# Patient Record
Sex: MALE | Race: WHITE | NOT HISPANIC OR LATINO | URBAN - METROPOLITAN AREA
[De-identification: names, ages, dates, MRNs, and addresses within clinical notes are randomized per-mention and may not be internally consistent; named-entity substitution may affect disease eponyms.]

---

## 2020-10-16 ENCOUNTER — OFFICE VISIT (OUTPATIENT)
Dept: ENDOCRINOLOGY | Facility: CLINIC | Age: 35
End: 2020-10-16

## 2020-10-16 VITALS
HEIGHT: 75 IN | TEMPERATURE: 98 F | OXYGEN SATURATION: 96 % | SYSTOLIC BLOOD PRESSURE: 122 MMHG | HEART RATE: 83 BPM | DIASTOLIC BLOOD PRESSURE: 74 MMHG | WEIGHT: 246.4 LBS | BODY MASS INDEX: 30.64 KG/M2 | RESPIRATION RATE: 16 BRPM

## 2020-10-16 DIAGNOSIS — IMO0002 DIABETES MELLITUS TYPE 2, UNCONTROLLED, WITH COMPLICATIONS: Primary | ICD-10-CM

## 2020-10-16 LAB — HBA1C MFR BLD: 7.5 %

## 2020-10-16 PROCEDURE — 99213 OFFICE O/P EST LOW 20 MIN: CPT | Performed by: INTERNAL MEDICINE

## 2020-10-16 PROCEDURE — 83036 HEMOGLOBIN GLYCOSYLATED A1C: CPT | Performed by: INTERNAL MEDICINE

## 2020-10-16 RX ORDER — DAPAGLIFLOZIN AND METFORMIN HYDROCHLORIDE 2.5; 1 MG/1; MG/1
1 TABLET, FILM COATED, EXTENDED RELEASE ORAL DAILY
Qty: 30 TABLET | Refills: 5 | Status: SHIPPED | OUTPATIENT
Start: 2020-10-16 | End: 2021-01-26 | Stop reason: SDUPTHER

## 2020-10-16 RX ORDER — FLASH GLUCOSE SENSOR
KIT MISCELLANEOUS
COMMUNITY
Start: 2020-10-03 | End: 2020-10-16 | Stop reason: SDUPTHER

## 2020-10-16 RX ORDER — INSULIN GLARGINE AND LIXISENATIDE 100; 33 U/ML; UG/ML
INJECTION, SOLUTION SUBCUTANEOUS
COMMUNITY
Start: 2020-09-28 | End: 2020-10-16 | Stop reason: SDUPTHER

## 2020-10-16 RX ORDER — MONTELUKAST SODIUM 10 MG/1
TABLET ORAL
Status: CANCELLED | OUTPATIENT
Start: 2020-10-16

## 2020-10-16 RX ORDER — DAPAGLIFLOZIN AND METFORMIN HYDROCHLORIDE 2.5; 1 MG/1; MG/1
TABLET, FILM COATED, EXTENDED RELEASE ORAL
COMMUNITY
Start: 2020-10-12 | End: 2020-10-16 | Stop reason: SDUPTHER

## 2020-10-16 RX ORDER — MONTELUKAST SODIUM 10 MG/1
10 TABLET ORAL NIGHTLY PRN
COMMUNITY
Start: 2020-10-14

## 2020-10-16 RX ORDER — PEN NEEDLE, DIABETIC 31 GX5/16"
NEEDLE, DISPOSABLE MISCELLANEOUS
COMMUNITY
Start: 2020-07-13 | End: 2021-03-22 | Stop reason: SDUPTHER

## 2020-10-16 RX ORDER — FLASH GLUCOSE SENSOR
1 KIT MISCELLANEOUS
Qty: 2 EACH | Refills: 5 | Status: SHIPPED | OUTPATIENT
Start: 2020-10-16 | End: 2021-03-22 | Stop reason: SDUPTHER

## 2020-10-16 RX ORDER — INSULIN GLARGINE AND LIXISENATIDE 100; 33 U/ML; UG/ML
60 INJECTION, SOLUTION SUBCUTANEOUS DAILY
Qty: 3 PEN | Refills: 5 | Status: SHIPPED | OUTPATIENT
Start: 2020-10-16 | End: 2021-03-22 | Stop reason: SDUPTHER

## 2020-10-16 NOTE — ASSESSMENT & PLAN NOTE
A1c - while not at goal is the best he has ever been. I would stay the course and ask him to force fluids to treat the dizziness

## 2020-10-16 NOTE — PROGRESS NOTES
Office Note      Date: 10/16/2020  Patient Name: Bobby Medina  MRN: [id number]  : 1985    Chief Complaint   Patient presents with   • Diabetes       History of Present Illness:   Bobby Medina is a 35 y.o. male who presents for Diabetes type 2. Diagnosed in: . Treated in past with insulin. Current treatments: insulin, metformin, sglt2 and glp1. Number of insulin shots per day: none. Checks blood sugar >4 times a day. Has low blood sugar: occasional.     Last A1c:  Hemoglobin A1C   Date Value Ref Range Status   10/16/2020 7.5 % Final       Changes in health since last visit: none . Last eye exam 2019.    Subjective      Diabetic Complications:  Eyes: No  Kidneys: No  Feet: No  Heart: No    Diet and Exercise:  Meals per day: 3  Minutes of exercise per week: 150  mins.    Review of Systems:   Review of Systems   Constitutional: Negative.    HENT: Negative.    Eyes: Negative.    Respiratory: Negative.    Endocrine: Negative.        The following portions of the patient's history were reviewed and updated as appropriate: allergies, current medications, past family history, past medical history, past social history, past surgical history and problem list.    Objective       Labs:    CMP  No results found for: GLUCOSE, BUN, CREATININE, EGFRIFNONA, EGFRIFAFRI, BCR, K, CO2, CALCIUM, PROTENTOTREF, LABIL2, BILIRUBIN, AST, ALT     CBC w/DIFF  No results found for: WBC, RBC, HGB, HCT, MCV, MCH, MCHC, RDW, RDWSD, MPV, PLT, NEUTRORELPCT, LYMPHORELPCT, MONORELPCT, EOSRELPCT, BASORELPCT, AUTOIGPER, NEUTROABS, LYMPHSABS, MONOSABS, EOSABS, BASOSABS, AUTOIGNUM, NRBC    Physical Exam:  Physical Exam  Vitals signs reviewed.   Constitutional:       Appearance: Normal appearance. He is normal weight.   Neck:      Musculoskeletal: Neck supple.   Cardiovascular:      Pulses:           Dorsalis pedis pulses are 2+ on the right side and 2+ on the left side.        Posterior tibial pulses are 2+ on the right side and 2+ on the  left side.   Musculoskeletal: Normal range of motion.      Right foot: Normal range of motion. No deformity, bunion, Charcot foot, foot drop or prominent metatarsal heads.      Left foot: Normal range of motion. No deformity, bunion, Charcot foot, foot drop or prominent metatarsal heads.   Feet:      Right foot:      Protective Sensation: 10 sites tested. 10 sites sensed.      Skin integrity: Skin integrity normal.      Toenail Condition: Right toenails are normal.      Left foot:      Protective Sensation: 10 sites tested. 10 sites sensed.      Skin integrity: Skin integrity normal.      Toenail Condition: Left toenails are normal.      Comments: Diabetic Foot Exam Performed and Monofilament Test Performed  Skin:     General: Skin is warm and dry.   Neurological:      Mental Status: He is alert.          Assessment / Plan      Assessment & Plan:  Problem List Items Addressed This Visit        Endocrine    Diabetes mellitus type 2, uncontrolled, with complications (CMS/[facility]) - Primary    Current Assessment & Plan     A1c - while not at goal is the best he has ever been. I would stay the course and ask him to force fluids to treat the dizziness          Relevant Medications    Soliqua 100-33 UNT-MCG/ML solution pen-injector injection    Xigduo XR 2.5-1000 MG tablet sustained-release 24 hour    Other Relevant Orders    POC Glycosylated Hemoglobin (Hb A1C) (Completed)           Rasheed Emanuel MD   10/16/2020

## 2021-01-26 DIAGNOSIS — IMO0002 DIABETES MELLITUS TYPE 2, UNCONTROLLED, WITH COMPLICATIONS: ICD-10-CM

## 2021-01-26 RX ORDER — DAPAGLIFLOZIN AND METFORMIN HYDROCHLORIDE 2.5; 1 MG/1; MG/1
2 TABLET, FILM COATED, EXTENDED RELEASE ORAL DAILY
Qty: 60 TABLET | Refills: 3 | Status: SHIPPED | OUTPATIENT
Start: 2021-01-26 | End: 2021-02-08 | Stop reason: SDUPTHER

## 2021-02-08 DIAGNOSIS — IMO0002 DIABETES MELLITUS TYPE 2, UNCONTROLLED, WITH COMPLICATIONS: ICD-10-CM

## 2021-02-08 RX ORDER — DAPAGLIFLOZIN AND METFORMIN HYDROCHLORIDE 2.5; 1 MG/1; MG/1
2 TABLET, FILM COATED, EXTENDED RELEASE ORAL DAILY
Qty: 60 TABLET | Refills: 5 | Status: SHIPPED | OUTPATIENT
Start: 2021-02-08 | End: 2021-03-22 | Stop reason: SDUPTHER

## 2021-02-08 NOTE — TELEPHONE ENCOUNTER
Patient called and needs a refill for his Xigduo 2.5 tablet sent to [facility] in [place]. Please advise.

## 2021-03-22 ENCOUNTER — OFFICE VISIT (OUTPATIENT)
Dept: ENDOCRINOLOGY | Facility: CLINIC | Age: 36
End: 2021-03-22

## 2021-03-22 VITALS
DIASTOLIC BLOOD PRESSURE: 80 MMHG | SYSTOLIC BLOOD PRESSURE: 126 MMHG | OXYGEN SATURATION: 97 % | HEART RATE: 74 BPM | TEMPERATURE: 98.2 F | WEIGHT: 248 LBS | HEIGHT: 75 IN | BODY MASS INDEX: 30.84 KG/M2

## 2021-03-22 DIAGNOSIS — IMO0002 DIABETES MELLITUS TYPE 2, UNCONTROLLED, WITH COMPLICATIONS: ICD-10-CM

## 2021-03-22 DIAGNOSIS — E11.65 UNCONTROLLED TYPE 2 DIABETES MELLITUS WITH HYPERGLYCEMIA (HCC): Primary | ICD-10-CM

## 2021-03-22 LAB
EXPIRATION DATE: NORMAL
HBA1C MFR BLD: 7.7 %
Lab: NORMAL

## 2021-03-22 PROCEDURE — 99213 OFFICE O/P EST LOW 20 MIN: CPT | Performed by: INTERNAL MEDICINE

## 2021-03-22 PROCEDURE — 83036 HEMOGLOBIN GLYCOSYLATED A1C: CPT | Performed by: INTERNAL MEDICINE

## 2021-03-22 RX ORDER — PEN NEEDLE, DIABETIC 31 GX5/16"
NEEDLE, DISPOSABLE MISCELLANEOUS
Qty: 100 EACH | Refills: 3 | Status: SHIPPED | OUTPATIENT
Start: 2021-03-22 | End: 2022-05-02

## 2021-03-22 RX ORDER — DAPAGLIFLOZIN AND METFORMIN HYDROCHLORIDE 2.5; 1 MG/1; MG/1
2 TABLET, FILM COATED, EXTENDED RELEASE ORAL DAILY
Qty: 180 TABLET | Refills: 3 | Status: SHIPPED | OUTPATIENT
Start: 2021-03-22 | End: 2021-07-20 | Stop reason: SDUPTHER

## 2021-03-22 RX ORDER — INSULIN GLARGINE AND LIXISENATIDE 100; 33 U/ML; UG/ML
60 INJECTION, SOLUTION SUBCUTANEOUS DAILY
Qty: 9 PEN | Refills: 3 | Status: SHIPPED | OUTPATIENT
Start: 2021-03-22 | End: 2021-11-02 | Stop reason: SDUPTHER

## 2021-03-22 RX ORDER — FLASH GLUCOSE SENSOR
1 KIT MISCELLANEOUS
Qty: 6 EACH | Refills: 3 | Status: SHIPPED | OUTPATIENT
Start: 2021-03-22 | End: 2022-05-02

## 2021-03-22 NOTE — PROGRESS NOTES
"     Office Note      Date: 2021  Patient Name: Bobby Medina  MRN: [id number]  : 1985    Chief Complaint   Patient presents with   • Diabetes       History of Present Illness:   Bobby Medina is a 36 y.o. male who presents for Diabetes- type 2 on soliqua and xigduo  bg checks-  Uses the candido.  Hypoglycemia- hardly ever     Last A1c:  Hemoglobin A1C   Date Value Ref Range Status   10/16/2020 7.5 % Final       Changes in health since last visit: none. Had covid last summer . Last eye exam due  Feet- no new issues.    Subjective      Review of Systems:   Review of Systems   Constitutional: Negative.    HENT: Negative.    Eyes: Negative.    Respiratory: Negative.    All other systems reviewed and are negative.      The following portions of the patient's history were reviewed and updated as appropriate: allergies, current medications, past family history, past medical history, past social history, past surgical history and problem list.  We reviewed the data on his phone for his candido.. average is 150     Objective     Visit Vitals  /80 (BP Location: Left arm, Patient Position: Sitting, Cuff Size: Adult)   Pulse 74   Temp 98.2 °F (36.8 °C) (Infrared)   Ht 190.5 cm (75\")   Wt 112 kg (248 lb)   SpO2 97%   BMI 31.00 kg/m²       Labs:    CMP  No results found for: GLUCOSE, BUN, CREATININE, EGFRIFNONA, EGFRIFAFRI, BCR, K, CO2, CALCIUM, PROTENTOTREF, LABIL2, BILIRUBIN, AST, ALT     CBC w/DIFF  No results found for: WBC, RBC, HGB, HCT, MCV, MCH, MCHC, RDW, RDWSD, MPV, PLT, NEUTRORELPCT, LYMPHORELPCT, MONORELPCT, EOSRELPCT, BASORELPCT, AUTOIGPER, NEUTROABS, LYMPHSABS, MONOSABS, EOSABS, BASOSABS, AUTOIGNUM, NRBC    Physical Exam:  Physical Exam  Vitals reviewed.   Constitutional:       Appearance: Normal appearance. He is normal weight.   Neurological:      Mental Status: He is alert and oriented to person, place, and time.   Psychiatric:         Mood and Affect: Mood normal.         Thought Content: Thought " content normal.         Judgment: Judgment normal.          Assessment / Plan      Assessment & Plan:  Problem List Items Addressed This Visit        Other    Uncontrolled type 2 diabetes mellitus with hyperglycemia (CMS/HCC) - Primary    Relevant Medications    Soliqua 100-33 UNT-MCG/ML solution pen-injector injection    Xigduo XR 2.5-1000 MG tablet sustained-release 24 hour    Other Relevant Orders    POC Glycosylated Hemoglobin (Hb A1C)           Rasheed Emanuel MD   03/22/2021

## 2021-07-20 DIAGNOSIS — IMO0002 DIABETES MELLITUS TYPE 2, UNCONTROLLED, WITH COMPLICATIONS: ICD-10-CM

## 2021-07-20 RX ORDER — DAPAGLIFLOZIN AND METFORMIN HYDROCHLORIDE 2.5; 1 MG/1; MG/1
2 TABLET, FILM COATED, EXTENDED RELEASE ORAL DAILY
Qty: 180 TABLET | Refills: 3 | Status: SHIPPED | OUTPATIENT
Start: 2021-07-20 | End: 2021-11-02 | Stop reason: SDUPTHER

## 2021-07-20 NOTE — TELEPHONE ENCOUNTER
PT CALLED REQUESTING AN RX OF ZIGDUO (2x A DAY) TO BE SENT IN TO [facility] IN [place], [place]. PLEASE AND THANK YOU

## 2021-09-21 ENCOUNTER — PRIOR AUTHORIZATION (OUTPATIENT)
Dept: ENDOCRINOLOGY | Facility: CLINIC | Age: 36
End: 2021-09-21

## 2021-09-21 NOTE — TELEPHONE ENCOUNTER
Drug is covered by current benefit plan. No further PA activity needed  Drug  Xigduo XR 2.5-1000MG er tablets  Form  [employer] Electronic PA Form (2017 NCPDP)

## 2021-11-02 ENCOUNTER — LAB (OUTPATIENT)
Dept: LAB | Facility: HOSPITAL | Age: 36
End: 2021-11-02

## 2021-11-02 ENCOUNTER — OFFICE VISIT (OUTPATIENT)
Dept: ENDOCRINOLOGY | Facility: CLINIC | Age: 36
End: 2021-11-02

## 2021-11-02 ENCOUNTER — SPECIALTY PHARMACY (OUTPATIENT)
Dept: PHARMACY | Facility: HOSPITAL | Age: 36
End: 2021-11-02

## 2021-11-02 ENCOUNTER — DISEASE STATE MANAGEMENT VISIT (OUTPATIENT)
Dept: PHARMACY | Facility: HOSPITAL | Age: 36
End: 2021-11-02

## 2021-11-02 VITALS
WEIGHT: 253 LBS | DIASTOLIC BLOOD PRESSURE: 92 MMHG | HEART RATE: 64 BPM | SYSTOLIC BLOOD PRESSURE: 134 MMHG | BODY MASS INDEX: 31.46 KG/M2 | HEIGHT: 75 IN | TEMPERATURE: 98.4 F | OXYGEN SATURATION: 98 %

## 2021-11-02 DIAGNOSIS — Z79.4 TYPE 2 DIABETES MELLITUS WITH HYPERGLYCEMIA, WITH LONG-TERM CURRENT USE OF INSULIN (HCC): ICD-10-CM

## 2021-11-02 DIAGNOSIS — Z79.4 TYPE 2 DIABETES MELLITUS WITH HYPERGLYCEMIA, WITH LONG-TERM CURRENT USE OF INSULIN (HCC): Primary | ICD-10-CM

## 2021-11-02 DIAGNOSIS — E11.65 TYPE 2 DIABETES MELLITUS WITH HYPERGLYCEMIA, WITH LONG-TERM CURRENT USE OF INSULIN (HCC): ICD-10-CM

## 2021-11-02 DIAGNOSIS — E11.65 TYPE 2 DIABETES MELLITUS WITH HYPERGLYCEMIA, WITH LONG-TERM CURRENT USE OF INSULIN (HCC): Primary | ICD-10-CM

## 2021-11-02 LAB
ALBUMIN SERPL-MCNC: 4.5 G/DL (ref 3.5–5.2)
ALBUMIN UR-MCNC: 1.5 MG/DL
ALBUMIN/GLOB SERPL: 1.6 G/DL
ALP SERPL-CCNC: 60 U/L (ref 39–117)
ALT SERPL W P-5'-P-CCNC: 46 U/L (ref 1–41)
ANION GAP SERPL CALCULATED.3IONS-SCNC: 9.6 MMOL/L (ref 5–15)
AST SERPL-CCNC: 24 U/L (ref 1–40)
BILIRUB SERPL-MCNC: 0.5 MG/DL (ref 0–1.2)
BUN SERPL-MCNC: 11 MG/DL (ref 6–20)
BUN/CREAT SERPL: 16.9 (ref 7–25)
CALCIUM SPEC-SCNC: 9.6 MG/DL (ref 8.6–10.5)
CHLORIDE SERPL-SCNC: 102 MMOL/L (ref 98–107)
CHOLEST SERPL-MCNC: 205 MG/DL (ref 0–200)
CO2 SERPL-SCNC: 26.4 MMOL/L (ref 22–29)
CREAT SERPL-MCNC: 0.65 MG/DL (ref 0.76–1.27)
CREAT UR-MCNC: 144.6 MG/DL
EXPIRATION DATE: ABNORMAL
GFR SERPL CREATININE-BSD FRML MDRD: 139 ML/MIN/1.73
GLOBULIN UR ELPH-MCNC: 2.9 GM/DL
GLUCOSE SERPL-MCNC: 139 MG/DL (ref 65–99)
HBA1C MFR BLD: 7.5 %
HDLC SERPL-MCNC: 39 MG/DL (ref 40–60)
LDLC SERPL CALC-MCNC: 139 MG/DL (ref 0–100)
LDLC/HDLC SERPL: 3.5 {RATIO}
Lab: ABNORMAL
MICROALBUMIN/CREAT UR: 10.4 MG/G
POTASSIUM SERPL-SCNC: 4.2 MMOL/L (ref 3.5–5.2)
PROT SERPL-MCNC: 7.4 G/DL (ref 6–8.5)
SODIUM SERPL-SCNC: 138 MMOL/L (ref 136–145)
TRIGL SERPL-MCNC: 148 MG/DL (ref 0–150)
TSH SERPL DL<=0.05 MIU/L-ACNC: 1.24 UIU/ML (ref 0.27–4.2)
VLDLC SERPL-MCNC: 27 MG/DL (ref 5–40)

## 2021-11-02 PROCEDURE — 80061 LIPID PANEL: CPT

## 2021-11-02 PROCEDURE — 80053 COMPREHEN METABOLIC PANEL: CPT

## 2021-11-02 PROCEDURE — 99213 OFFICE O/P EST LOW 20 MIN: CPT | Performed by: NURSE PRACTITIONER

## 2021-11-02 PROCEDURE — 84443 ASSAY THYROID STIM HORMONE: CPT

## 2021-11-02 PROCEDURE — 82043 UR ALBUMIN QUANTITATIVE: CPT

## 2021-11-02 PROCEDURE — 82570 ASSAY OF URINE CREATININE: CPT

## 2021-11-02 PROCEDURE — 36415 COLL VENOUS BLD VENIPUNCTURE: CPT

## 2021-11-02 RX ORDER — DAPAGLIFLOZIN AND METFORMIN HYDROCHLORIDE 2.5; 1 MG/1; MG/1
2 TABLET, FILM COATED, EXTENDED RELEASE ORAL DAILY
Qty: 60 TABLET | Refills: 5 | Status: SHIPPED | OUTPATIENT
Start: 2021-11-02 | End: 2022-04-19 | Stop reason: SDUPTHER

## 2021-11-02 RX ORDER — INSULIN GLARGINE AND LIXISENATIDE 100; 33 U/ML; UG/ML
60 INJECTION, SOLUTION SUBCUTANEOUS DAILY
Qty: 15 ML | Refills: 5 | Status: SHIPPED | OUTPATIENT
Start: 2021-11-02 | End: 2022-04-19 | Stop reason: SDUPTHER

## 2021-11-02 RX ORDER — DAPAGLIFLOZIN AND METFORMIN HYDROCHLORIDE 2.5; 1 MG/1; MG/1
2 TABLET, FILM COATED, EXTENDED RELEASE ORAL DAILY
Qty: 30 TABLET | Refills: 5 | Status: SHIPPED | OUTPATIENT
Start: 2021-11-02 | End: 2021-11-02

## 2021-11-02 RX ORDER — PEN NEEDLE, DIABETIC 32GX 5/32"
1 NEEDLE, DISPOSABLE MISCELLANEOUS DAILY
Qty: 100 EACH | Refills: 5 | Status: SHIPPED | OUTPATIENT
Start: 2021-11-02 | End: 2022-05-02

## 2021-11-02 NOTE — ASSESSMENT & PLAN NOTE
-Diabetes is improving with treatment.   Continue current treatment regimen.   -Continue Soliqua 60 units daily.  -Continue Xigduo 2tabs QD.  -Patient requested Freestyle Lilli 2.  Will send to pharmacy.  -Will get fasting labs today.  -Diabetes will be reassessed in 6 months.

## 2021-11-02 NOTE — PROGRESS NOTES
Bobby Medina is a 36 y.o. male seen by Endocrinology and assessed by the [facility] Pharmacist at [facility]. Patient reported being a diabetic for 24 years. He was treated as a type 1 up until 8 years ago. He remains reasonably controlled with last A1C at 7.7% in March 2021 and down to 7.5% today. He does struggle with lows at times, but overall reports no issues with medications.      Past Medical History:   Diagnosis Date   • Hypercholesterolemia    • Hypoglycemia    • Well controlled type 2 diabetes mellitus (HCC)      Social History     Socioeconomic History   • Marital status:    Tobacco Use   • Smoking status: Never Smoker   • Smokeless tobacco: Never Used   Vaping Use   • Vaping Use: Never used   Substance and Sexual Activity   • Alcohol use: Not Currently   • Drug use: Never   • Sexual activity: Defer     Patient has no known allergies.    Current Outpatient Medications:   •  B-D ULTRAFINE III SHORT PEN 31G X 8 MM misc, For daily use, Disp: 100 each, Rfl: 3  •  Continuous Blood Gluc Sensor (FreeStyle Lilil 14 Day Sensor) misc, Apply 1 kit topically to the appropriate area as directed Every 14 (Fourteen) Days., Disp: 6 each, Rfl: 3  •  montelukast (SINGULAIR) 10 MG tablet, , Disp: , Rfl:   •  Soliqua 100-33 UNT-MCG/ML solution pen-injector injection, Inject 60 Units under the skin into the appropriate area as directed Daily., Disp: 9 pen, Rfl: 3  •  Xigduo XR 2.5-1000 MG tablet sustained-release 24 hour, Take 2 tablets by mouth Daily., Disp: 180 tablet, Rfl: 3      Labs:    There were no vitals filed for this visit.  There were no vitals filed for this visit.  Lab Results   Component Value Date    HGBA1C 7.5 11/02/2021     No results found for: GLUCOSE, CALCIUM, NA, K, CO2, CL, BUN, CREATININE, EGFRIFAFRI, EGFRIFNONA, BCR, ANIONGAP  No results found for: CHOL, CHLPL, TRIG, HDL, LDL, LDLDIRECT      Current 10-Year ASCVD Risk: unable to accurately assess due to lack of  "recent lipid panel and patient age. He reports trying statin in the past but d/cing due to \"brain fog\"    Drug-Drug Interactions: n/a    Vaccination Status   COVID 19: UTD  Influenza: needed, but patient reports that he does not ever get the flu shot  Pneumococcal: counseled patient that he would benefit from PPSV23 since he is T2DM between age 19-64    Patient Assistance: not needed at this time    Medication Assessment & Plan:     Counseled patient on managing lows. This was reported after asking about his dose of Soliqua- he reports not taking 60 units every night because of lows. I inquired about how low and he said he has been in the 40s before. I educated on the Rule of 15 and patient states that he uses glucose tablets when he needs to.     Soliqua  · Take within the hour before your first meal every day  • Given as a shot in the fatty part of your skin on the top of your thigh, belly area or upper arm   o Put the cap back on after you are done using your dose.  Throw away needles in a sharps container and do not reuse needles.  Attach a new needle before each use. Patient reports needing pen needles   • Nausea, vomiting, and diarrhea are most common when first starting, but they should decrease over time  o Patient reports no GI upset  Xigduo XR  • Discussed the medication's MOA and that it may cause more frequent urination particularly upon starting the medication  • Take one tablet in the morning with or without food    • Encouraged to drink plenty of water as to not get dehydrated especially in warmer weather or with activity  • Also discussed there may be an increased incidence of UTIs or yeast infections and to monitor for signs/symptoms  • Encouraged to take with food as it may cause N/V/D if taken on empty stomach  Patient takes both doses in the morning, because he was missing the second dose frequently and Dr. Holliday told him to double up in the morning. He reports feeling dizzy at times and I told " him that can happen with one of the components of the tablet.     Patient was educated about vaccines. He was not interested in flu vaccine, but inquired more about pneumonia.     Patient also wanted to switch to candido 2.     Patient is interested in using our pharmacy services.     Ce Wang RPH  11/2/2021  09:30 EDT

## 2021-11-02 NOTE — PROGRESS NOTES
"Chief Complaint   Patient presents with   • Diabetes     Follow up           HPI   Bobby Medina is a 36 y.o. male had concerns including Diabetes (Follow up ).  T2DM.  He is checking blood sugars frequently with Freestyle Lilli.  Current medications for diabetes include Xigduo 2.5 mg/1,000 mg (2 tabs QD) and Soliqua 60 units QD.  He rarely has hypoglycemia.  He has been taking varying dosing on his insulin based upon his blood sugars.    Last eye exam was in July 2021-normal results.    The following portions of the patient's history were reviewed and updated as appropriate: allergies, current medications, past family history, past medical history, past social history, past surgical history and problem list.    Review of Systems   Constitutional: Negative for appetite change.   Endocrine: Negative for cold intolerance, heat intolerance, polydipsia, polyphagia and polyuria.   All other systems reviewed and are negative.       Physical Exam  Vitals reviewed.   Constitutional:       Appearance: Normal appearance.   Eyes:      Extraocular Movements: Extraocular movements intact.   Cardiovascular:      Rate and Rhythm: Normal rate.   Pulmonary:      Effort: Pulmonary effort is normal.   Neurological:      Mental Status: He is alert and oriented to person, place, and time.   Psychiatric:         Mood and Affect: Mood normal.         Behavior: Behavior normal.         Thought Content: Thought content normal.         Judgment: Judgment normal.        /92 (BP Location: Left arm, Patient Position: Sitting, Cuff Size: Adult)   Pulse 64   Temp 98.4 °F (36.9 °C) (Oral)   Ht 190.5 cm (75\")   Wt 115 kg (253 lb)   SpO2 98%   BMI 31.62 kg/m²      Labs and imaging    CMP:     Lipid Panel:  No results found for: CHOL, TRIG, HDL, VLDL, LDL  HbA1c:  Lab Results   Component Value Date    HGBA1C 7.5 11/02/2021    HGBA1C 7.7 03/22/2021     Glucose:  No results found for: POCGLU  Microalbumin:  No results found for: " GARO  TSH:  No results found for: TSH    Assessment and plan  Diagnoses and all orders for this visit:    1. Type 2 diabetes mellitus with hyperglycemia, with long-term current use of insulin (HCC) (Primary)  Assessment & Plan:  -Diabetes is improving with treatment.   Continue current treatment regimen.   -Continue Soliqua 60 units daily.  -Continue Xigduo 2tabs QD.  -Patient requested Freestyle Lilli 2.  Will send to pharmacy.  -Will get fasting labs today.  -Diabetes will be reassessed in 6 months.    Orders:  -     POC Glycosylated Hemoglobin (Hb A1C)  -     Comprehensive Metabolic Panel; Future  -     Lipid Panel; Future  -     Microalbumin / Creatinine Urine Ratio - Urine, Clean Catch; Future  -     TSH; Future       Return in about 6 months (around 5/2/2022) for A1C, Follow-up appointment. The patient was instructed to contact the clinic with any interval questions or concerns.    PEDRO Shepard

## 2021-11-05 ENCOUNTER — SPECIALTY PHARMACY (OUTPATIENT)
Dept: PHARMACY | Facility: HOSPITAL | Age: 36
End: 2021-11-05

## 2021-11-29 ENCOUNTER — SPECIALTY PHARMACY (OUTPATIENT)
Dept: PHARMACY | Facility: HOSPITAL | Age: 36
End: 2021-11-29

## 2021-12-23 ENCOUNTER — SPECIALTY PHARMACY (OUTPATIENT)
Dept: PHARMACY | Facility: HOSPITAL | Age: 36
End: 2021-12-23

## 2021-12-23 NOTE — PROGRESS NOTES
Specialty Pharmacy from 11/5/2021 in [facility] with Ce Wang RPH Specialty Pharmacy from 11/29/2021 in [facility] with Ce Wang RPH Specialty Pharmacy from 12/23/2021 in [facility] with Ce Wang RPH     11/5/21 11/29/21 12/23/21    1112   0835   0928     Refill Coordination     Changes to allergies? No No No   Changes to medications? No No No   New conditions since last clinic visit No No No   Unplanned office visit, urgent care, ED, or hospital admission in the last 4 weeks  No No No   How does patient/caregiver feel medication is working? Good Good Good   Financial problems or insurance changes  No No No   If yes, describe changes in insurance or financial issues. -- n/a --   How many doses of your specialty medications were missed in the last 4 weeks? 0 none --   Why were doses missed? N/A n/a --

## 2021-12-28 ENCOUNTER — TELEPHONE (OUTPATIENT)
Dept: PHARMACY | Facility: HOSPITAL | Age: 36
End: 2021-12-28

## 2021-12-28 NOTE — TELEPHONE ENCOUNTER
Patient called today stating that he only received one box which had one Lilli sensor. Apothecary will arrange to send another box. Patient aware.

## 2021-12-29 ENCOUNTER — SPECIALTY PHARMACY (OUTPATIENT)
Dept: PHARMACY | Facility: HOSPITAL | Age: 36
End: 2021-12-29

## 2022-01-18 ENCOUNTER — TELEPHONE (OUTPATIENT)
Dept: PHARMACY | Facility: HOSPITAL | Age: 37
End: 2022-01-18

## 2022-01-18 NOTE — TELEPHONE ENCOUNTER
Patient wishes to pursue patient assistance to help with his high copay. Lilli COVARRUBIAS submitted 01/18- he has 10 days left on Soliqua. Waiting to get lilli approved to send out Soliqua and fill out application for patient assistance.

## 2022-01-20 ENCOUNTER — TELEPHONE (OUTPATIENT)
Dept: PHARMACY | Facility: HOSPITAL | Age: 37
End: 2022-01-20

## 2022-01-20 NOTE — TELEPHONE ENCOUNTER
Talked to patient to let him know that PA has not been approved yet, but will send out Lilli with Soliqua.

## 2022-01-26 ENCOUNTER — SPECIALTY PHARMACY (OUTPATIENT)
Dept: PHARMACY | Facility: HOSPITAL | Age: 37
End: 2022-01-26

## 2022-01-26 NOTE — PROGRESS NOTES
Appeal started for Lilli. Insurance does not want to pay for CGM unless patient on multiple daily injections of insulin or uses a pump.

## 2022-02-16 ENCOUNTER — SPECIALTY PHARMACY (OUTPATIENT)
Dept: PHARMACY | Facility: HOSPITAL | Age: 37
End: 2022-02-16

## 2022-02-16 NOTE — PROGRESS NOTES
Specialty Pharmacy Refill Coordination Note      Name:  Bobby Medina  :  1985  Date:  2022         Past Medical History:   Diagnosis Date   • Hypercholesterolemia    • Hypoglycemia    • Well controlled type 2 diabetes mellitus (HCC)        Past Surgical History:   Procedure Laterality Date   • FIBULA FRACTURE SURGERY Right 1998       Social History     Socioeconomic History   • Marital status:    Tobacco Use   • Smoking status: Never Smoker   • Smokeless tobacco: Never Used   Vaping Use   • Vaping Use: Never used   Substance and Sexual Activity   • Alcohol use: Not Currently   • Drug use: Never   • Sexual activity: Defer       Family History   Problem Relation Age of Onset   • Leukemia Mother         and NHL   • Diabetes Father    • Hypertension Father    • Obesity Father    • Diabetes Sister    • Obesity Sister    • Diabetes Brother    • Obesity Brother        No Known Allergies    Current Outpatient Medications   Medication Sig Dispense Refill   • B-D ULTRAFINE III SHORT PEN 31G X 8 MM misc For daily use 100 each 3   • Continuous Blood Gluc  (FreeStyle Lilli 2 [place]) device Use to check blood sugar daily as directed 1 each 0   • Continuous Blood Gluc Sensor (FreeStyle Lilli 14 Day Sensor) misc Apply 1 kit topically to the appropriate area as directed Every 14 (Fourteen) Days. 6 each 3   • Continuous Blood Gluc Sensor (FreeStyle Lilli 2 Sensor) misc Use to check blood sugar and change sensor every 14 (Fourteen) Days. 2 each 5   • Dapagliflozin-metFORMIN HCl ER (Xigduo XR) 2.5-1000 MG tablet sustained-release 24 hour Take 2 tablets by mouth Daily. 60 tablet 5   • Insulin Glargine-Lixisenatide (Soliqua) 100-33 UNT-MCG/ML solution pen-injector injection Inject 60 Units under the skin into the appropriate area as directed Daily. 15 mL 5   • Insulin Pen Needle (BD Pen Needle Agustina U/F) 32G X 4 MM misc Use 1 daily to inject Soliqua 100 each 5   • montelukast (SINGULAIR) 10 MG tablet         No current facility-administered medications for this visit.         ASSESSMENT/PLAN:      Bobby is a 36 y.o. male contacted today regarding refills of  Soliqua 100-33 unit-mcg/ml specialty medication(s).    Reviewed and verified with patient:       Specialty medication(s) and dose(s) confirmed: yes    Refill Questions      Most Recent Value   Changes to allergies? No   Changes to medications? No   New conditions since last clinic visit No   Unplanned office visit, urgent care, ED, or hospital admission in the last 4 weeks  No   How does patient/caregiver feel medication is working? Very good   Financial problems or insurance changes  No   If yes, describe changes in insurance or financial issues. None   How many doses of your specialty medications were missed in the last 4 weeks? 0   Why were doses missed? NA   Does this patient require a clinical escalation to a pharmacist? No                     Follow-up: 15 day(s)     Cornelio Mtz, Pharmacy Technician  Specialty Pharmacy Technician

## 2022-02-23 ENCOUNTER — SPECIALTY PHARMACY (OUTPATIENT)
Dept: PHARMACY | Facility: HOSPITAL | Age: 37
End: 2022-02-23

## 2022-02-23 NOTE — PROGRESS NOTES
Specialty Pharmacy Refill Coordination Note      Name:  Bobby Medina  :  1985  Date:  2022         Past Medical History:   Diagnosis Date   • Hypercholesterolemia    • Hypoglycemia    • Well controlled type 2 diabetes mellitus (HCC)        Past Surgical History:   Procedure Laterality Date   • FIBULA FRACTURE SURGERY Right 1998       Social History     Socioeconomic History   • Marital status:    Tobacco Use   • Smoking status: Never Smoker   • Smokeless tobacco: Never Used   Vaping Use   • Vaping Use: Never used   Substance and Sexual Activity   • Alcohol use: Not Currently   • Drug use: Never   • Sexual activity: Defer       Family History   Problem Relation Age of Onset   • Leukemia Mother         and NHL   • Diabetes Father    • Hypertension Father    • Obesity Father    • Diabetes Sister    • Obesity Sister    • Diabetes Brother    • Obesity Brother        No Known Allergies    Current Outpatient Medications   Medication Sig Dispense Refill   • B-D ULTRAFINE III SHORT PEN 31G X 8 MM misc For daily use 100 each 3   • Continuous Blood Gluc  (FreeStyle Lilli 2 [place]) device Use to check blood sugar daily as directed 1 each 0   • Continuous Blood Gluc Sensor (FreeStyle Lilli 14 Day Sensor) misc Apply 1 kit topically to the appropriate area as directed Every 14 (Fourteen) Days. 6 each 3   • Continuous Blood Gluc Sensor (FreeStyle Lilli 2 Sensor) misc Use to check blood sugar and change sensor every 14 (Fourteen) Days. 2 each 5   • Dapagliflozin-metFORMIN HCl ER (Xigduo XR) 2.5-1000 MG tablet sustained-release 24 hour Take 2 tablets by mouth Daily. 60 tablet 5   • Insulin Glargine-Lixisenatide (Soliqua) 100-33 UNT-MCG/ML solution pen-injector injection Inject 60 Units under the skin into the appropriate area as directed Daily. 15 mL 5   • Insulin Pen Needle (BD Pen Needle Agustina U/F) 32G X 4 MM misc Use 1 daily to inject Soliqua 100 each 5   • montelukast (SINGULAIR) 10 MG tablet         No current facility-administered medications for this visit.         ASSESSMENT/PLAN:      Bobby is a 36 y.o. male contacted today regarding refills of Xigduo XR 2.5-1000 mg tablet sustained-release 24 hours specialty medication(s).    Reviewed and verified with patient:       Specialty medication(s) and dose(s) confirmed: yes    Refill Questions      Most Recent Value   Changes to allergies? No   Changes to medications? No   New conditions since last clinic visit No   Unplanned office visit, urgent care, ED, or hospital admission in the last 4 weeks  No   How does patient/caregiver feel medication is working? Very good   Financial problems or insurance changes  No   If yes, describe changes in insurance or financial issues. None   How many doses of your specialty medications were missed in the last 4 weeks? 0   Why were doses missed? NA   Does this patient require a clinical escalation to a pharmacist? No                     Follow-up: 30 day(s)     Cornelio Mtz, Pharmacy Technician  Specialty Pharmacy Technician

## 2022-02-25 ENCOUNTER — TELEPHONE (OUTPATIENT)
Dept: ENDOCRINOLOGY | Facility: CLINIC | Age: 37
End: 2022-02-25

## 2022-02-25 NOTE — TELEPHONE ENCOUNTER
Patient wants to see if we can refill Lilli. He is aware that there has been issues with insurance and just wanted to see if we can retry for the Lilli.

## 2022-02-28 ENCOUNTER — TELEPHONE (OUTPATIENT)
Dept: PHARMACY | Facility: HOSPITAL | Age: 37
End: 2022-02-28

## 2022-03-01 ENCOUNTER — TELEPHONE (OUTPATIENT)
Dept: PHARMACY | Facility: HOSPITAL | Age: 37
End: 2022-03-01

## 2022-03-01 RX ORDER — PROCHLORPERAZINE 25 MG/1
1 SUPPOSITORY RECTAL
Qty: 1 EACH | Refills: 5 | Status: SHIPPED | OUTPATIENT
Start: 2022-03-01 | End: 2022-05-02

## 2022-03-01 RX ORDER — PROCHLORPERAZINE 25 MG/1
SUPPOSITORY RECTAL
Qty: 3 EACH | Refills: 5 | Status: SHIPPED | OUTPATIENT
Start: 2022-03-01 | End: 2022-05-02

## 2022-03-01 RX ORDER — PROCHLORPERAZINE 25 MG/1
1 SUPPOSITORY RECTAL CONTINUOUS
Qty: 1 EACH | Refills: 5 | Status: SHIPPED | OUTPATIENT
Start: 2022-03-01 | End: 2022-05-02

## 2022-03-08 ENCOUNTER — SPECIALTY PHARMACY (OUTPATIENT)
Dept: PHARMACY | Facility: HOSPITAL | Age: 37
End: 2022-03-08

## 2022-03-29 ENCOUNTER — SPECIALTY PHARMACY (OUTPATIENT)
Dept: PHARMACY | Facility: HOSPITAL | Age: 37
End: 2022-03-29

## 2022-03-29 RX ORDER — LANCETS 33 GAUGE
EACH MISCELLANEOUS
Qty: 100 EACH | Refills: 5 | Status: SHIPPED | OUTPATIENT
Start: 2022-03-29

## 2022-03-29 RX ORDER — BLOOD-GLUCOSE METER
1 EACH MISCELLANEOUS DAILY
Qty: 1 EACH | Refills: 0 | Status: SHIPPED | OUTPATIENT
Start: 2022-03-29

## 2022-03-29 NOTE — PROGRESS NOTES
Patient stated that his insurance is no longer paying for FreeStyle Lilli sensors. Will send in prescriptions for a new meter, test strips, and lancets for patient to use. Insurance notified office that they will not pay for it as patient is not on basal/bolus regimen at this time.

## 2022-04-19 ENCOUNTER — SPECIALTY PHARMACY (OUTPATIENT)
Dept: PHARMACY | Facility: HOSPITAL | Age: 37
End: 2022-04-19

## 2022-04-19 RX ORDER — INSULIN GLARGINE AND LIXISENATIDE 100; 33 U/ML; UG/ML
60 INJECTION, SOLUTION SUBCUTANEOUS DAILY
Qty: 15 ML | Refills: 5 | Status: SHIPPED | OUTPATIENT
Start: 2022-04-19 | End: 2022-05-02 | Stop reason: SDUPTHER

## 2022-04-19 RX ORDER — DAPAGLIFLOZIN AND METFORMIN HYDROCHLORIDE 2.5; 1 MG/1; MG/1
2 TABLET, FILM COATED, EXTENDED RELEASE ORAL DAILY
Qty: 60 TABLET | Refills: 5 | Status: SHIPPED | OUTPATIENT
Start: 2022-04-19 | End: 2022-05-02 | Stop reason: SDUPTHER

## 2022-04-19 NOTE — PROGRESS NOTES
Specialty Pharmacy Patient Management Program  Endocrinology Reassessment     Bobby Medina is a 37 y.o. male with Type 2 Diabetes enrolled in the Endocrinology Patient Management program offered by [facility].  An assessment of continued therapy appropriateness, medication adherence, and side effect incidence and management was conducted for  Xigduo and Soliqua.     Patient is currently taking Xigduo 2.5-1,000 mg 2 tablets dailly and Soliqua 60 units daily to control BG. He reports no side effects to current medications. His insurance will not cover Dexcom or FreeStyle Lilli at this time. He is back to checking his BG with glucometer. Patient needs refills on diabetes medications.     Changes to Insurance Coverage or Financial Support  None    Relevant Past Medical History and Comorbidities  Relevant medical history and concomitant health conditions were discussed with the patient. The patient's chart has been reviewed for relevant past medical history and comorbid health conditions and updated as necessary.   Past Medical History:   Diagnosis Date   • Hypercholesterolemia    • Hypoglycemia    • Well controlled type 2 diabetes mellitus (HCC)      Social History     Socioeconomic History   • Marital status:    Tobacco Use   • Smoking status: Never Smoker   • Smokeless tobacco: Never Used   Vaping Use   • Vaping Use: Never used   Substance and Sexual Activity   • Alcohol use: Not Currently   • Drug use: Never   • Sexual activity: Defer            Allergies  Known allergies and reactions were discussed with the patient. The patient's chart has been reviewed for allergy information and updated as necessary.   Patient has no known allergies.    Allergies reviewed by Kerri Bolaños RPH on 4/19/2022 at 12:01 PM    Relevant Laboratory Values  A1C Last 3 Results    HGBA1C Last 3 Results 11/2/21   Hemoglobin A1C 7.5           Lab Results   Component Value Date    HGBA1C 7.5 11/02/2021     Lab  Results   Component Value Date    GLUCOSE 139 (H) 11/02/2021    CALCIUM 9.6 11/02/2021     11/02/2021    K 4.2 11/02/2021    CO2 26.4 11/02/2021     11/02/2021    BUN 11 11/02/2021    CREATININE 0.65 (L) 11/02/2021    EGFRIFNONA 139 11/02/2021    BCR 16.9 11/02/2021    ANIONGAP 9.6 11/02/2021     Lab Results   Component Value Date    CHOL 205 (H) 11/02/2021    TRIG 148 11/02/2021    HDL 39 (L) 11/02/2021     (H) 11/02/2021     Microalbumin    Microalbumin 11/2/21   Microalbumin, Urine 1.5             Current Medication List  This medication list has been reviewed with the patient and evaluated for any interactions or necessary modifications/recommendations, and updated to include all prescription medications, OTC medications, and supplements the patient is currently taking.  This list reflects what is contained in the patient's profile, which has also been marked as reviewed to communicate to other providers it is the most up to date version of the patient's current medication therapy.     Current Outpatient Medications:   •  B-D ULTRAFINE III SHORT PEN 31G X 8 MM misc, For daily use, Disp: 100 each, Rfl: 3  •  Blood Glucose Monitoring Suppl (ONE TOUCH ULTRA 2) w/Device kit, Use to check blood sugar 3 times daily, Disp: 1 each, Rfl: 0  •  Dapagliflozin-metFORMIN HCl ER (Xigduo XR) 2.5-1000 MG tablet sustained-release 24 hour, Take 2 tablets by mouth Daily., Disp: 60 tablet, Rfl: 5  •  glucose blood test strip, Use to check blood sugar 3 times daily, Disp: 100 each, Rfl: 5  •  Insulin Glargine-Lixisenatide (Soliqua) 100-33 UNT-MCG/ML solution pen-injector injection, Inject 60 Units under the skin into the appropriate area as directed Daily., Disp: 15 mL, Rfl: 5  •  Insulin Pen Needle (BD Pen Needle Agustina U/F) 32G X 4 MM misc, Use 1 daily to inject Soliqua, Disp: 100 each, Rfl: 5  •  Lancets (OneTouch Delica Plus [id number]) misc, Use to check blood sugar 3 times daily, Disp: 100 each, Rfl: 5  •   montelukast (SINGULAIR) 10 MG tablet, , Disp: , Rfl:   •  Continuous Blood Gluc  (Dexcom G6 ) device, USE  Continuously., Disp: 1 each, Rfl: 5  •  Continuous Blood Gluc  (FreeStyle Lilli 2 Gaston) device, Use to check blood sugar daily as directed, Disp: 1 each, Rfl: 0  •  Continuous Blood Gluc Sensor (Dexcom G6 Sensor), Change sensor Every 10 (Ten) Days., Disp: 3 each, Rfl: 5  •  Continuous Blood Gluc Sensor (FreeStyle Lilli 14 Day Sensor) misc, Apply 1 kit topically to the appropriate area as directed Every 14 (Fourteen) Days., Disp: 6 each, Rfl: 3  •  Continuous Blood Gluc Sensor (FreeStyle Lilli 2 Sensor) misc, Use to check blood sugar and change sensor every 14 (Fourteen) Days., Disp: 6 each, Rfl: 3  •  Continuous Blood Gluc Transmit (Dexcom G6 Transmitter) misc, Use 1 transmitter Every 3 (Three) Months., Disp: 1 each, Rfl: 5    Medicines reviewed by Kerri Bolaños [facility] on 4/19/2022 at 12:01 PM    Drug Interactions  None    Adverse Drug Reactions  • Adverse Reactions Experienced: None  • Plan for ADR Management: Not Required    Hospitalizations and Urgent Care Since Last Assessment  • Hospitalizations or Admissions: None  • ED Visits: None   • Urgent Office Visits: None    Adherence and Self-Administration  Adherence related patient's specialty therapy was discussed with the patient. The Adherence segment of this outreach has been reviewed and updated.      • Ongoing or New Barriers to Patient Adherence and/or Self-Administration: None  • Methods for Supporting Patient Adherence and/or Self-Administration: None required    Goals of Therapy  Goals related to the patient's specialty therapy was discussed with the patient. The Patient Goals segment of this outreach has been reviewed and updated.    Goals     • HEMOGLOBIN A1C < 7            Quality of Life Assessment   Quality of Life related to the patient's specialty therapy was discussed with the patient. The QOL segment of this  outreach has been reviewed and updated.     Quality of Life Assessment  Quality of Life Improvement Scale: A good deal better    Reassessment Plan & Follow-Up  1. Medication Therapy Changes: None  2. Additional Plans, Therapy Recommendations, or Therapy Problems to Be Addressed:  Patient needs refills on his Xigduo and Soliqua today. Will send prescriptions to pharmacy and mail to patient.   3. Pharmacist to perform regular reassessments no more than (6) months from the previous assessment.  4. Care Coordinator to set up future refill outreaches, coordinate prescription delivery, and escalate clinical questions to pharmacist.     Attestation  I attest that the specialty medication(s) addressed above are appropriate for the patient based on my reassessment.  If the prescribed therapy is at any point deemed not appropriate based on the current or future assessments, a consultation will be initiated with the patient's specialty care provider to determine the best course of action. The revised plan of therapy will be documented along with any additional patient education provided.     Kerri Bolaños, Noemy  4/19/2022  12:06 EDT

## 2022-05-02 ENCOUNTER — OFFICE VISIT (OUTPATIENT)
Dept: ENDOCRINOLOGY | Facility: CLINIC | Age: 37
End: 2022-05-02

## 2022-05-02 ENCOUNTER — SPECIALTY PHARMACY (OUTPATIENT)
Dept: PHARMACY | Facility: HOSPITAL | Age: 37
End: 2022-05-02

## 2022-05-02 VITALS
SYSTOLIC BLOOD PRESSURE: 130 MMHG | BODY MASS INDEX: 33.07 KG/M2 | DIASTOLIC BLOOD PRESSURE: 80 MMHG | HEART RATE: 62 BPM | OXYGEN SATURATION: 97 % | WEIGHT: 266 LBS | HEIGHT: 75 IN

## 2022-05-02 DIAGNOSIS — E11.65 TYPE 2 DIABETES MELLITUS WITH HYPERGLYCEMIA, WITH LONG-TERM CURRENT USE OF INSULIN: Primary | ICD-10-CM

## 2022-05-02 DIAGNOSIS — Z79.4 TYPE 2 DIABETES MELLITUS WITH HYPERGLYCEMIA, WITH LONG-TERM CURRENT USE OF INSULIN: Primary | ICD-10-CM

## 2022-05-02 LAB
EXPIRATION DATE: ABNORMAL
GLUCOSE BLDC GLUCOMTR-MCNC: 124 MG/DL (ref 70–130)
HBA1C MFR BLD: 7.6 %
Lab: ABNORMAL

## 2022-05-02 PROCEDURE — 83036 HEMOGLOBIN GLYCOSYLATED A1C: CPT | Performed by: NURSE PRACTITIONER

## 2022-05-02 PROCEDURE — 99213 OFFICE O/P EST LOW 20 MIN: CPT | Performed by: NURSE PRACTITIONER

## 2022-05-02 PROCEDURE — 82962 GLUCOSE BLOOD TEST: CPT | Performed by: NURSE PRACTITIONER

## 2022-05-02 RX ORDER — PEN NEEDLE, DIABETIC 32GX 5/32"
1 NEEDLE, DISPOSABLE MISCELLANEOUS DAILY
Qty: 100 EACH | Refills: 5 | Status: SHIPPED | OUTPATIENT
Start: 2022-05-02 | End: 2022-05-02

## 2022-05-02 RX ORDER — DAPAGLIFLOZIN AND METFORMIN HYDROCHLORIDE 2.5; 1 MG/1; MG/1
2 TABLET, FILM COATED, EXTENDED RELEASE ORAL DAILY
Qty: 60 TABLET | Refills: 5 | Status: SHIPPED | OUTPATIENT
Start: 2022-05-02 | End: 2022-05-02

## 2022-05-02 RX ORDER — INSULIN GLARGINE AND LIXISENATIDE 100; 33 U/ML; UG/ML
60 INJECTION, SOLUTION SUBCUTANEOUS DAILY
Qty: 15 ML | Refills: 5 | Status: SHIPPED | OUTPATIENT
Start: 2022-05-02 | End: 2022-05-02

## 2022-05-02 RX ORDER — DAPAGLIFLOZIN AND METFORMIN HYDROCHLORIDE 2.5; 1 MG/1; MG/1
2 TABLET, FILM COATED, EXTENDED RELEASE ORAL DAILY
Qty: 60 TABLET | Refills: 5 | Status: SHIPPED | OUTPATIENT
Start: 2022-05-02 | End: 2022-11-08 | Stop reason: SDUPTHER

## 2022-05-02 RX ORDER — INSULIN GLARGINE AND LIXISENATIDE 100; 33 U/ML; UG/ML
60 INJECTION, SOLUTION SUBCUTANEOUS DAILY
Qty: 15 ML | Refills: 5 | Status: SHIPPED | OUTPATIENT
Start: 2022-05-02 | End: 2022-11-08 | Stop reason: SDUPTHER

## 2022-05-02 NOTE — PROGRESS NOTES
Chief Complaint   Patient presents with   • Diabetes     Follow up        Bobby Medina is a 37 y.o. male had concerns including Diabetes (Follow up).    T2DM.    He is checking blood sugars up to 3 times daily.  Current medications for diabetes include Xigduo 2.5 mg/1,000 mg (2 tabs QD) and Soliqua 60 units QD.  He rarely has hypoglycemia.      Last eye exam is due and is scheduled.    The following portions of the patient's history were reviewed and updated as appropriate: allergies, current medications, past family history, past medical history, past social history, past surgical history and problem list.    Review of Systems   Constitutional: Negative for appetite change.   Endocrine: Negative for cold intolerance, heat intolerance, polydipsia, polyphagia and polyuria.   All other systems reviewed and are negative.       Physical Exam  Vitals reviewed.   Constitutional:       Appearance: Normal appearance.   Eyes:      Extraocular Movements: Extraocular movements intact.   Cardiovascular:      Rate and Rhythm: Normal rate.      Pulses:           Dorsalis pedis pulses are 2+ on the right side and 2+ on the left side.        Posterior tibial pulses are 2+ on the right side and 2+ on the left side.   Pulmonary:      Effort: Pulmonary effort is normal.   Feet:      Right foot:      Protective Sensation: 10 sites tested. 10 sites sensed.      Skin integrity: Skin integrity normal.      Toenail Condition: Right toenails are normal.      Left foot:      Protective Sensation: 10 sites tested. 10 sites sensed.      Skin integrity: Skin integrity normal.      Toenail Condition: Left toenails are normal.      Comments: Diabetic Foot Exam Performed and Monofilament Test Performed  Neurological:      Mental Status: He is alert and oriented to person, place, and time.   Psychiatric:         Mood and Affect: Mood normal.         Behavior: Behavior normal.         Thought Content: Thought content normal.         Judgment:  "Judgment normal.        /80 (BP Location: Left arm, Patient Position: Sitting, Cuff Size: Adult)   Pulse 62   Ht 190.5 cm (75\")   Wt 121 kg (266 lb)   SpO2 97%   BMI 33.25 kg/m²      Labs and imaging    CMP:  Lab Results   Component Value Date    BUN 11 11/02/2021    CREATININE 0.65 (L) 11/02/2021    EGFRIFNONA 139 11/02/2021    BCR 16.9 11/02/2021     11/02/2021    K 4.2 11/02/2021    CO2 26.4 11/02/2021    CALCIUM 9.6 11/02/2021    ALBUMIN 4.50 11/02/2021    BILITOT 0.5 11/02/2021    ALKPHOS 60 11/02/2021    AST 24 11/02/2021    ALT 46 (H) 11/02/2021     Lipid Panel:  Lab Results   Component Value Date    CHOL 205 (H) 11/02/2021    TRIG 148 11/02/2021    HDL 39 (L) 11/02/2021    VLDL 27 11/02/2021     (H) 11/02/2021     HbA1c:  Lab Results   Component Value Date    HGBA1C 7.6 05/02/2022    HGBA1C 7.5 11/02/2021     Glucose:    Lab Results   Component Value Date    POCGLU 124 05/02/2022     Microalbumin:  Lab Results   Component Value Date    MALBCRERATIO 10.4 11/02/2021     TSH:  Lab Results   Component Value Date    TSH 1.240 11/02/2021       Assessment and plan  Diagnoses and all orders for this visit:    1. Type 2 diabetes mellitus with hyperglycemia, with long-term current use of insulin (HCC) (Primary)  Assessment & Plan:  -Diabetes is unchanged with A1C 7.6.  Continue current treatment regimen.   -Continue Soliqua 60 units daily.  -Continue Xigduo 2tabs QD.  -Patient had a CGM and the insurance no longer will cover.  Will try to PA this for him.  He is informed to check his BG regularly with fingerstick's to monitor BG's.  -He has not been following any form of diet plan.  Reviewed dietary and exercise guidelines. He will start implementing these.  -Discussed importance of yearly eye exams.  -S/S hypoglycemia reviewed with Rule of 15's advised.  -Diabetes will be reassessed in 6 months.    Orders:  -     POC Glucose Fingerstick  -     POC Glycosylated Hemoglobin (Hb A1C)    Other " orders  -     Discontinue: Dapagliflozin-metFORMIN HCl ER (Xigduo XR) 2.5-1000 MG tablet sustained-release 24 hour; Take 2 tablets by mouth Daily.  Dispense: 60 tablet; Refill: 5  -     Discontinue: Insulin Glargine-Lixisenatide (Soliqua) 100-33 UNT-MCG/ML solution pen-injector injection; Inject 60 Units under the skin into the appropriate area as directed Daily.  Dispense: 15 mL; Refill: 5  -     Discontinue: Insulin Pen Needle (BD Pen Needle Agustina U/F) 32G X 4 MM misc; Use 1 daily to inject Soliqua  Dispense: 100 each; Refill: 5  -     Discontinue: glucose blood test strip; Use to check blood sugar 3 times daily  Dispense: 100 each; Refill: 5       Return in about 6 months (around 11/2/2022) for Follow-up appointment, A1C. The patient was instructed to contact the clinic with any interval questions or concerns.    This document has been electronically signed by PEDRO Shepard  May 2, 2022 11:31 EDT  Endocrinology

## 2022-05-02 NOTE — PROGRESS NOTES
Specialty Pharmacy Patient Management Program  Endocrinology Reassessment     Bobby Medina is a 37 y.o. male with Type 2 Diabetes enrolled in the Endocrinology Patient Management program offered by [facility].  An assessment of continued therapy appropriateness, medication adherence, and side effect incidence and management was conducted for Xigduo and Soliqua.     Patient is currently taking Xigduo 2.5-1,000 mg 2 tablets daily and Soliqua 60 units daily to control BG. He reports no side effects to current medications. His insurance hasn't been paying for Dexcom/FreeStyle. Insurance will not pay for CGM until patient is on at least 2 injections per day or on an insulin pump. He is back to checking his BG with glucometer 4 times daily with a range between 170-200.     Changes to Insurance Coverage or Financial Support  Insurance won't pay for CGM until patient is on at least 2 insulin injections per day or using a pump.     Relevant Past Medical History and Comorbidities  Relevant medical history and concomitant health conditions were discussed with the patient. The patient's chart has been reviewed for relevant past medical history and comorbid health conditions and updated as necessary.   Past Medical History:   Diagnosis Date   • Hypercholesterolemia    • Hypoglycemia    • Well controlled type 2 diabetes mellitus (HCC)      Social History     Socioeconomic History   • Marital status:    Tobacco Use   • Smoking status: Never Smoker   • Smokeless tobacco: Never Used   Vaping Use   • Vaping Use: Never used   Substance and Sexual Activity   • Alcohol use: Not Currently   • Drug use: Never   • Sexual activity: Defer            Allergies  Known allergies and reactions were discussed with the patient. The patient's chart has been reviewed for allergy information and updated as necessary.   Patient has no known allergies.    Allergies reviewed by Kerri Bolaños RPH on 5/2/2022 at 10:17  AM    Relevant Laboratory Values  A1C Last 3 Results    HGBA1C Last 3 Results 11/2/21 5/2/22   Hemoglobin A1C 7.5 7.6           Lab Results   Component Value Date    HGBA1C 7.6 05/02/2022     Lab Results   Component Value Date    GLUCOSE 139 (H) 11/02/2021    CALCIUM 9.6 11/02/2021     11/02/2021    K 4.2 11/02/2021    CO2 26.4 11/02/2021     11/02/2021    BUN 11 11/02/2021    CREATININE 0.65 (L) 11/02/2021    EGFRIFNONA 139 11/02/2021    BCR 16.9 11/02/2021    ANIONGAP 9.6 11/02/2021     Lab Results   Component Value Date    CHOL 205 (H) 11/02/2021    TRIG 148 11/02/2021    HDL 39 (L) 11/02/2021     (H) 11/02/2021     Microalbumin    Microalbumin 11/2/21   Microalbumin, Urine 1.5             Current Medication List  This medication list has been reviewed with the patient and evaluated for any interactions or necessary modifications/recommendations, and updated to include all prescription medications, OTC medications, and supplements the patient is currently taking.  This list reflects what is contained in the patient's profile, which has also been marked as reviewed to communicate to other providers it is the most up to date version of the patient's current medication therapy.     Current Outpatient Medications:   •  Blood Glucose Monitoring Suppl (ONE TOUCH ULTRA 2) w/Device kit, Use to check blood sugar 3 times daily, Disp: 1 each, Rfl: 0  •  Dapagliflozin-metFORMIN HCl ER (Xigduo XR) 2.5-1000 MG tablet sustained-release 24 hour, Take 2 tablets by mouth Daily., Disp: 60 tablet, Rfl: 5  •  Insulin Glargine-Lixisenatide (Soliqua) 100-33 UNT-MCG/ML solution pen-injector injection, Inject 60 Units under the skin into the appropriate area as directed Daily., Disp: 15 mL, Rfl: 5  •  Lancets (OneTouch Delica Plus [id number]) misc, Use to check blood sugar 3 times daily, Disp: 100 each, Rfl: 5  •  montelukast (SINGULAIR) 10 MG tablet, , Disp: , Rfl:     Medicines reviewed by Kerri Bolaños, [facility] on  5/2/2022 at 10:18 AM  Medicines reviewed by Kerri Bolaños RP on 5/2/2022 at 10:18 AM    Drug Interactions  None    Adverse Drug Reactions  • Adverse Reactions Experienced: None  • Plan for ADR Management: Not Required    Hospitalizations and Urgent Care Since Last Assessment  • Hospitalizations or Admissions: None  • ED Visits: None   • Urgent Office Visits: None    Adherence and Self-Administration  Adherence related patient's specialty therapy was discussed with the patient. The Adherence segment of this outreach has been reviewed and updated.      • Ongoing or New Barriers to Patient Adherence and/or Self-Administration: None  • Methods for Supporting Patient Adherence and/or Self-Administration: None required    Goals of Therapy  Goals related to the patient's specialty therapy was discussed with the patient. The Patient Goals segment of this outreach has been reviewed and updated.    Goals     • HEMOGLOBIN A1C < 7            Quality of Life Assessment   Quality of Life related to the patient's specialty therapy was discussed with the patient. The QOL segment of this outreach has been reviewed and updated.     Quality of Life Assessment  Quality of Life Improvement Scale: A good deal better    Reassessment Plan & Follow-Up  1. Medication Therapy Changes: None discussed with patient   2. Additional Plans, Therapy Recommendations, or Therapy Problems to Be Addressed:  Patient's diabetes has remained unchanged with A1C of 7.6%. He has better control when he is using a CGM. Insurance will not cover until he is on at least 2 insulin injections per day. Continue to reassess.   3. Pharmacist to perform regular reassessments no more than (6) months from the previous assessment.  4. Care Coordinator to set up future refill outreaches, coordinate prescription delivery, and escalate clinical questions to pharmacist.     Attestation  I attest that the specialty medication(s) addressed above are appropriate for the patient  based on my reassessment.  If the prescribed therapy is at any point deemed not appropriate based on the current or future assessments, a consultation will be initiated with the patient's specialty care provider to determine the best course of action. The revised plan of therapy will be documented along with any additional patient education provided.     Kerri Bolaños, PharmAMBAR  5/2/2022  12:10 EDT

## 2022-05-02 NOTE — ASSESSMENT & PLAN NOTE
-Diabetes is unchanged with A1C 7.6.  Continue current treatment regimen.   -Continue Soliqua 60 units daily.  -Continue Xigduo 2tabs QD.  -Patient had a CGM and the insurance no longer will cover.  Will try to PA this for him.  He is informed to check his BG regularly with fingerstick's to monitor BG's.  -He has not been following any form of diet plan.  Reviewed dietary and exercise guidelines. He will start implementing these.  -Discussed importance of yearly eye exams.  -S/S hypoglycemia reviewed with Rule of 15's advised.  -Diabetes will be reassessed in 6 months.

## 2022-05-24 ENCOUNTER — SPECIALTY PHARMACY (OUTPATIENT)
Dept: PHARMACY | Facility: HOSPITAL | Age: 37
End: 2022-05-24

## 2022-06-17 ENCOUNTER — SPECIALTY PHARMACY (OUTPATIENT)
Dept: PHARMACY | Facility: HOSPITAL | Age: 37
End: 2022-06-17

## 2022-06-17 RX ORDER — PEN NEEDLE, DIABETIC 30 GX3/16"
1 NEEDLE, DISPOSABLE MISCELLANEOUS DAILY
Qty: 100 EACH | Refills: 5 | Status: SHIPPED | OUTPATIENT
Start: 2022-06-17 | End: 2022-11-08

## 2022-07-13 ENCOUNTER — SPECIALTY PHARMACY (OUTPATIENT)
Dept: PHARMACY | Facility: HOSPITAL | Age: 37
End: 2022-07-13

## 2022-08-09 ENCOUNTER — SPECIALTY PHARMACY (OUTPATIENT)
Dept: PHARMACY | Facility: HOSPITAL | Age: 37
End: 2022-08-09

## 2022-08-31 ENCOUNTER — SPECIALTY PHARMACY (OUTPATIENT)
Dept: PHARMACY | Facility: HOSPITAL | Age: 37
End: 2022-08-31

## 2022-09-26 ENCOUNTER — SPECIALTY PHARMACY (OUTPATIENT)
Dept: PHARMACY | Facility: HOSPITAL | Age: 37
End: 2022-09-26

## 2022-11-08 ENCOUNTER — SPECIALTY PHARMACY (OUTPATIENT)
Dept: PHARMACY | Facility: HOSPITAL | Age: 37
End: 2022-11-08

## 2022-11-08 ENCOUNTER — OFFICE VISIT (OUTPATIENT)
Dept: ENDOCRINOLOGY | Facility: CLINIC | Age: 37
End: 2022-11-08

## 2022-11-08 VITALS
SYSTOLIC BLOOD PRESSURE: 148 MMHG | OXYGEN SATURATION: 98 % | WEIGHT: 262 LBS | BODY MASS INDEX: 32.58 KG/M2 | DIASTOLIC BLOOD PRESSURE: 86 MMHG | HEART RATE: 72 BPM | HEIGHT: 75 IN

## 2022-11-08 DIAGNOSIS — Z79.4 TYPE 2 DIABETES MELLITUS WITH HYPERGLYCEMIA, WITH LONG-TERM CURRENT USE OF INSULIN: Primary | ICD-10-CM

## 2022-11-08 DIAGNOSIS — E11.65 TYPE 2 DIABETES MELLITUS WITH HYPERGLYCEMIA, WITH LONG-TERM CURRENT USE OF INSULIN: Primary | ICD-10-CM

## 2022-11-08 LAB
EXPIRATION DATE: NORMAL
GLUCOSE BLDC GLUCOMTR-MCNC: 145 MG/DL (ref 70–130)
HBA1C MFR BLD: 8.2 %
Lab: NORMAL

## 2022-11-08 PROCEDURE — 83036 HEMOGLOBIN GLYCOSYLATED A1C: CPT | Performed by: NURSE PRACTITIONER

## 2022-11-08 PROCEDURE — 99213 OFFICE O/P EST LOW 20 MIN: CPT | Performed by: NURSE PRACTITIONER

## 2022-11-08 PROCEDURE — 82962 GLUCOSE BLOOD TEST: CPT | Performed by: NURSE PRACTITIONER

## 2022-11-08 RX ORDER — INSULIN GLARGINE AND LIXISENATIDE 100; 33 U/ML; UG/ML
60 INJECTION, SOLUTION SUBCUTANEOUS DAILY
Qty: 15 ML | Refills: 5 | Status: SHIPPED | OUTPATIENT
Start: 2022-11-08 | End: 2023-03-27 | Stop reason: SDUPTHER

## 2022-11-08 RX ORDER — PEN NEEDLE, DIABETIC 32 GX 1/4"
1 NEEDLE, DISPOSABLE MISCELLANEOUS DAILY
Qty: 100 EACH | Refills: 5 | Status: SHIPPED | OUTPATIENT
Start: 2022-11-08

## 2022-11-08 RX ORDER — PEN NEEDLE, DIABETIC 30 GX3/16"
1 NEEDLE, DISPOSABLE MISCELLANEOUS DAILY
Qty: 100 EACH | Refills: 5 | Status: CANCELLED | OUTPATIENT
Start: 2022-11-08

## 2022-11-08 RX ORDER — BLOOD-GLUCOSE SENSOR
1 EACH MISCELLANEOUS
Qty: 2 EACH | Refills: 5 | Status: SHIPPED | OUTPATIENT
Start: 2022-11-08

## 2022-11-08 RX ORDER — DAPAGLIFLOZIN AND METFORMIN HYDROCHLORIDE 2.5; 1 MG/1; MG/1
2 TABLET, FILM COATED, EXTENDED RELEASE ORAL DAILY
Qty: 60 TABLET | Refills: 5 | Status: SHIPPED | OUTPATIENT
Start: 2022-11-08 | End: 2023-03-27 | Stop reason: SDUPTHER

## 2022-11-08 NOTE — PROGRESS NOTES
Specialty Pharmacy Patient Management Program  Endocrinology Reassessment     Bobby Medina is a 37 y.o. male with Type 2 Diabetes seen by an Endocrinology provider and enrolled in the Endocrinology Patient Management program offered by [facility]. An assessment of continued therapy appropriateness, medication adherence, and side effect incidence and management was conducted for Xigduo and Soliqua.     Patient is currently taking Xigduo 2.5-1,000 mg 2 tablets daily and Soliqua 60 units daily to control BG. He reports no side effects to current medications. He uses FreeStyle Lilli to monitor BG. He reports rare low BG <70 (~1 per  Month). He is interested in switching to FreeStyle Lilli 3 today. He needs refills on all medications.     Changes to Insurance Coverage or Financial Support  None    Relevant Past Medical History and Comorbidities  Relevant medical history and concomitant health conditions were discussed with the patient. The patient's chart has been reviewed for relevant past medical history and comorbid health conditions and updated as necessary.   Past Medical History:   Diagnosis Date   • Hypercholesterolemia    • Hypoglycemia    • Well controlled type 2 diabetes mellitus (HCC)      Social History     Socioeconomic History   • Marital status:    Tobacco Use   • Smoking status: Never   • Smokeless tobacco: Never   Vaping Use   • Vaping Use: Never used   Substance and Sexual Activity   • Alcohol use: Not Currently   • Drug use: Never   • Sexual activity: Defer       Problem list reviewed by Kerri Bolaños RPH on 11/8/2022 at  2:47 PM    Allergies  Known allergies and reactions were discussed with the patient. The patient's chart has been reviewed for allergy information and updated as necessary.   Patient has no known allergies.    Allergies reviewed by Kerri Bolaños RPH on 11/8/2022 at  2:46 PM    Relevant Laboratory Values  A1C Last 3 Results    HGBA1C Last 3 Results  5/2/22 11/8/22   Hemoglobin A1C 7.6 8.2           Lab Results   Component Value Date    HGBA1C 8.2 11/08/2022     Lab Results   Component Value Date    GLUCOSE 139 (H) 11/02/2021    CALCIUM 9.6 11/02/2021     11/02/2021    K 4.2 11/02/2021    CO2 26.4 11/02/2021     11/02/2021    BUN 11 11/02/2021    CREATININE 0.65 (L) 11/02/2021    EGFRIFNONA 139 11/02/2021    BCR 16.9 11/02/2021    ANIONGAP 9.6 11/02/2021     Lab Results   Component Value Date    CHOL 205 (H) 11/02/2021    TRIG 148 11/02/2021    HDL 39 (L) 11/02/2021     (H) 11/02/2021         Current Medication List  This medication list has been reviewed with the patient and evaluated for any interactions or necessary modifications/recommendations, and updated to include all prescription medications, OTC medications, and supplements the patient is currently taking.  This list reflects what is contained in the patient's profile, which has also been marked as reviewed to communicate to other providers it is the most up to date version of the patient's current medication therapy.     Current Outpatient Medications:   •  Blood Glucose Monitoring Suppl (ONE TOUCH ULTRA 2) w/Device kit, Use to check blood sugar 3 times daily, Disp: 1 each, Rfl: 0  •  Continuous Blood Gluc  (FreeStyle Lilli 2 Lucien) device, Use to check blood sugar daily as directed, Disp: 1 each, Rfl: 0  •  Dapagliflozin-metFORMIN HCl ER (Xigduo XR) 2.5-1000 MG tablet sustained-release 24 hour, Take 2 tablets by mouth Daily., Disp: 60 tablet, Rfl: 5  •  Insulin Glargine-Lixisenatide (Soliqua) 100-33 UNT-MCG/ML solution pen-injector injection, Inject 60 Units under the skin into the appropriate area as directed Daily., Disp: 15 mL, Rfl: 5  •  Lancets (OneTouch Delica Plus [id number]) misc, Use to check blood sugar 3 times daily, Disp: 100 each, Rfl: 5  •  montelukast (SINGULAIR) 10 MG tablet, Take 1 tablet by mouth At Night As Needed., Disp: , Rfl:   •  Continuous Blood Gluc  Sensor (FreeStyle Lilli 3 Sensor) misc, 1 each Every 14 (Fourteen) Days. Change every 14 days, Disp: 2 each, Rfl: 5  •  Insulin Pen Needle 32G X 8 MM misc, 1 each Daily. Use to inject insulin once daily as directed, Disp: 100 each, Rfl: 5    Medicines reviewed by Kerri Bolaños RPH on 11/8/2022 at  2:47 PM    Drug Interactions  None relevant to diabetes care    Adverse Drug Reactions  • Adverse Reactions Experienced: None  • Plan for ADR Management: Not Required    Hospitalizations and Urgent Care Since Last Assessment  • Hospitalizations or Admissions: None  • ED Visits: None   • Urgent Office Visits: None    Adherence and Self-Administration  Adherence related patient's specialty therapy was discussed with the patient. The Adherence segment of this outreach has been reviewed and updated.      • Ongoing or New Barriers to Patient Adherence and/or Self-Administration: None  • Methods for Supporting Patient Adherence and/or Self-Administration: None required    Goals of Therapy  Goals related to the patient's specialty therapy was discussed with the patient. The Patient Goals segment of this outreach has been reviewed and updated.    Goals     • Consistently take medications as prescribed     • HEMOGLOBIN A1C < 7            Quality of Life Assessment   Quality of Life related to the patient's specialty therapy was discussed with the patient. The QOL segment of this outreach has been reviewed and updated.     Quality of Life Assessment  Quality of Life Improvement Scale: A good deal better  Comments on Quality of Life: None    Reassessment Plan & Follow-Up  1. Patient's diabetes has slightly worsened with A1C up to 8.2% today from 7.6%. Discussed lifestyle modifications. No pharmacologic recommendations at this time.   2. Medication Therapy Changes: Switch to FreeStyle Lilli 3  3. Additional Plans, Therapy Recommendations, or Therapy Problems to Be Addressed: Will send updated prescriptions to [facility] to mail to  patient.   4. Pharmacist to perform regular reassessments no more than (6) months from the previous assessment.  5. Care Coordinator to set up future refill outreaches, coordinate prescription delivery, and escalate clinical questions to pharmacist.     Attestation  I attest that the specialty medication(s) addressed above are appropriate for the patient based on my reassessment.  If the prescribed therapy is at any point deemed not appropriate based on the current or future assessments, a consultation will be initiated with the patient's specialty care provider to determine the best course of action. The revised plan of therapy will be documented along with any additional patient education provided.     Kerri Bolaños, PharmD  11/8/2022  15:06 EST   Detail Level: Detailed

## 2022-11-08 NOTE — PROGRESS NOTES
"Chief Complaint   Patient presents with   • Diabetes     F/u        Bobby Medina is a 37 y.o. male had concerns including Diabetes (F/u).    T2DM.    He is checking blood sugars with Lilli CGM.  Current medications for diabetes include Xigduo 2.5 mg/1,000 mg (2 tabs QD) and Soliqua 60 units QD.  He rarely has hypoglycemia.      Last eye exam is utd 2022.    The following portions of the patient's history were reviewed and updated as appropriate: allergies, current medications, past family history, past medical history, past social history, past surgical history and problem list.    Review of Systems   Constitutional: Negative for appetite change.   Endocrine: Negative for cold intolerance, heat intolerance, polydipsia, polyphagia and polyuria.   All other systems reviewed and are negative.       Physical Exam  Vitals reviewed.   Constitutional:       Appearance: Normal appearance.   Eyes:      Extraocular Movements: Extraocular movements intact.   Cardiovascular:      Rate and Rhythm: Normal rate.   Pulmonary:      Effort: Pulmonary effort is normal.   Neurological:      Mental Status: He is alert and oriented to person, place, and time.   Psychiatric:         Mood and Affect: Mood normal.         Behavior: Behavior normal.         Thought Content: Thought content normal.         Judgment: Judgment normal.        /86 (BP Location: Left arm, Patient Position: Sitting, Cuff Size: Adult)   Pulse 72   Ht 190.5 cm (75\")   Wt 119 kg (262 lb)   SpO2 98%   BMI 32.75 kg/m²      Labs and imaging    CMP:  Lab Results   Component Value Date    BUN 11 11/02/2021    CREATININE 0.65 (L) 11/02/2021    EGFRIFNONA 139 11/02/2021    BCR 16.9 11/02/2021     11/02/2021    K 4.2 11/02/2021    CO2 26.4 11/02/2021    CALCIUM 9.6 11/02/2021    ALBUMIN 4.50 11/02/2021    BILITOT 0.5 11/02/2021    ALKPHOS 60 11/02/2021    AST 24 11/02/2021    ALT 46 (H) 11/02/2021     Lipid Panel:  Lab Results   Component Value Date    CHOL " 205 (H) 11/02/2021    TRIG 148 11/02/2021    HDL 39 (L) 11/02/2021    VLDL 27 11/02/2021     (H) 11/02/2021     HbA1c:  Lab Results   Component Value Date    HGBA1C 8.2 11/08/2022    HGBA1C 7.6 05/02/2022     Glucose:    Lab Results   Component Value Date    POCGLU 145 (A) 11/08/2022     Microalbumin:  Lab Results   Component Value Date    MALBCRERATIO 10.4 11/02/2021     TSH:  Lab Results   Component Value Date    TSH 1.240 11/02/2021       Assessment and plan  Diagnoses and all orders for this visit:    1. Type 2 diabetes mellitus with hyperglycemia, with long-term current use of insulin (HCC) (Primary)  Assessment & Plan:  -Diabetes is slightly worse with A1C up from 7.6 to 8.2.  Continue current treatment regimen.   -Continue Soliqua 60 units daily.  -Continue Xigduo 2tabs QD.  -Continue checking BG's regularly.  Continue with Lilli CGM.  -He has not been following any form of diet plan.  Reviewed dietary and exercise guidelines. He will start implementing these.  -Discussed importance of yearly eye exams.  -S/S hypoglycemia reviewed with Rule of 15's advised.  -Diabetes will be reassessed in 6 months.    Orders:  -     POC Glucose Fingerstick  -     POC Glycosylated Hemoglobin (Hb A1C)       No follow-ups on file. The patient was instructed to contact the clinic with any interval questions or concerns.    This document has been electronically signed by PEDRO Shepard  November 8, 2022 15:13 EST  Endocrinology

## 2022-11-08 NOTE — ASSESSMENT & PLAN NOTE
-Diabetes is slightly worse with A1C up from 7.6 to 8.2.  Continue current treatment regimen.   -Continue Soliqua 60 units daily.  -Continue Xigduo 2tabs QD.  -Continue checking BG's regularly.  Continue with Lilli CGM.  -He has not been following any form of diet plan.  Reviewed dietary and exercise guidelines. He will start implementing these.  -Discussed importance of yearly eye exams.  -S/S hypoglycemia reviewed with Rule of 15's advised.  -Diabetes will be reassessed in 6 months.

## 2022-11-30 ENCOUNTER — SPECIALTY PHARMACY (OUTPATIENT)
Dept: PHARMACY | Facility: HOSPITAL | Age: 37
End: 2022-11-30

## 2022-12-22 ENCOUNTER — SPECIALTY PHARMACY (OUTPATIENT)
Dept: PHARMACY | Facility: HOSPITAL | Age: 37
End: 2022-12-22

## 2023-01-17 ENCOUNTER — SPECIALTY PHARMACY (OUTPATIENT)
Dept: PHARMACY | Facility: HOSPITAL | Age: 38
End: 2023-01-17
Payer: COMMERCIAL

## 2023-02-10 ENCOUNTER — SPECIALTY PHARMACY (OUTPATIENT)
Dept: PHARMACY | Facility: HOSPITAL | Age: 38
End: 2023-02-10
Payer: COMMERCIAL

## 2023-03-07 ENCOUNTER — SPECIALTY PHARMACY (OUTPATIENT)
Dept: PHARMACY | Facility: HOSPITAL | Age: 38
End: 2023-03-07
Payer: COMMERCIAL

## 2023-03-27 ENCOUNTER — SPECIALTY PHARMACY (OUTPATIENT)
Dept: PHARMACY | Facility: HOSPITAL | Age: 38
End: 2023-03-27
Payer: COMMERCIAL

## 2023-03-27 RX ORDER — INSULIN GLARGINE AND LIXISENATIDE 100; 33 U/ML; UG/ML
60 INJECTION, SOLUTION SUBCUTANEOUS DAILY
Qty: 15 ML | Refills: 5 | Status: SHIPPED | OUTPATIENT
Start: 2023-03-27 | End: 2023-03-27 | Stop reason: SDUPTHER

## 2023-03-27 RX ORDER — DAPAGLIFLOZIN AND METFORMIN HYDROCHLORIDE 2.5; 1 MG/1; MG/1
2 TABLET, FILM COATED, EXTENDED RELEASE ORAL DAILY
Qty: 60 TABLET | Refills: 5 | Status: SHIPPED | OUTPATIENT
Start: 2023-03-27

## 2023-03-27 RX ORDER — DAPAGLIFLOZIN AND METFORMIN HYDROCHLORIDE 2.5; 1 MG/1; MG/1
2 TABLET, FILM COATED, EXTENDED RELEASE ORAL DAILY
Qty: 60 TABLET | Refills: 5 | Status: SHIPPED | OUTPATIENT
Start: 2023-03-27 | End: 2023-03-27 | Stop reason: SDUPTHER

## 2023-03-27 RX ORDER — INSULIN GLARGINE AND LIXISENATIDE 100; 33 U/ML; UG/ML
60 INJECTION, SOLUTION SUBCUTANEOUS DAILY
Qty: 15 ML | Refills: 5 | Status: SHIPPED | OUTPATIENT
Start: 2023-03-27

## 2023-03-27 NOTE — PROGRESS NOTES
Specialty Pharmacy Patient Management Program  Endocrinology Reassessment     Bobby Medina is a 38 y.o. male with Type 2 Diabetes enrolled in the Endocrinology Patient Management program offered by [facility]. An assessment of continued therapy appropriateness, medication adherence, and side effect incidence and management was conducted for Xigduo and Soliqua.     Patient is currently taking Xigduo 2.5-1,000 mg 2 tablets daily and Soliqua 60 units daily to control BG. He denies side effects to current medications. He uses FreeStyle Lilli to monitor BG. He denies low BG <70. He needs refills on Soliqua and Xigduo.     Changes to Insurance Coverage or Financial Support  None    Relevant Past Medical History and Comorbidities  Relevant medical history and concomitant health conditions were discussed with the patient. The patient's chart has been reviewed for relevant past medical history and comorbid health conditions and updated as necessary.   Past Medical History:   Diagnosis Date   • Hypercholesterolemia    • Hypoglycemia    • Well controlled type 2 diabetes mellitus (HCC)      Social History     Socioeconomic History   • Marital status:    Tobacco Use   • Smoking status: Never   • Smokeless tobacco: Never   Vaping Use   • Vaping Use: Never used   Substance and Sexual Activity   • Alcohol use: Not Currently   • Drug use: Never   • Sexual activity: Defer       Problem list reviewed by Kerri Bolaños RPH on 3/27/2023 at 10:01 AM    Allergies  Known allergies and reactions were discussed with the patient. The patient's chart has been reviewed for allergy information and updated as necessary.   Patient has no known allergies.    Allergies reviewed by Kerri Bolaños RPH on 3/27/2023 at 10:01 AM    Relevant Laboratory Values  A1C Last 3 Results    HGBA1C Last 3 Results 5/2/22 11/8/22   Hemoglobin A1C 7.6 8.2           Lab Results   Component Value Date    HGBA1C 8.2 11/08/2022     Lab  Results   Component Value Date    GLUCOSE 139 (H) 11/02/2021    CALCIUM 9.6 11/02/2021     11/02/2021    K 4.2 11/02/2021    CO2 26.4 11/02/2021     11/02/2021    BUN 11 11/02/2021    CREATININE 0.65 (L) 11/02/2021    EGFRIFNONA 139 11/02/2021    BCR 16.9 11/02/2021    ANIONGAP 9.6 11/02/2021     Lab Results   Component Value Date    CHOL 205 (H) 11/02/2021    TRIG 148 11/02/2021    HDL 39 (L) 11/02/2021     (H) 11/02/2021         Current Medication List  This medication list has been reviewed with the patient and evaluated for any interactions or necessary modifications/recommendations, and updated to include all prescription medications, OTC medications, and supplements the patient is currently taking.  This list reflects what is contained in the patient's profile, which has also been marked as reviewed to communicate to other providers it is the most up to date version of the patient's current medication therapy.     Current Outpatient Medications:   •  Blood Glucose Monitoring Suppl (ONE TOUCH ULTRA 2) w/Device kit, Use to check blood sugar 3 times daily, Disp: 1 each, Rfl: 0  •  Continuous Blood Gluc Sensor (FreeStyle Lilli 3 Sensor) [facility], Change sensor every 14 days., Disp: 2 each, Rfl: 5  •  Dapagliflozin-metFORMIN HCl ER (Xigduo XR) 2.5-1000 MG tablet sustained-release 24 hour, Take 2 tablets by mouth Daily., Disp: 60 tablet, Rfl: 5  •  Insulin Glargine-Lixisenatide (Soliqua) 100-33 UNT-MCG/ML solution pen-injector injection, Inject 60 Units under the skin into the appropriate area as directed Daily., Disp: 15 mL, Rfl: 5  •  Insulin Pen Needle (Novofine Pen Needle) 32G X 6 MM misc, Use to inject insulin once daily as directed, Disp: 100 each, Rfl: 5  •  Lancets (OneTouch Delica Plus [id number]) misc, Use to check blood sugar 3 times daily, Disp: 100 each, Rfl: 5  •  montelukast (SINGULAIR) 10 MG tablet, Take 1 tablet by mouth At Night As Needed. (Patient not taking: Reported on 3/27/2023),  Disp: , Rfl:     Medicines reviewed by Kerri Bolaños RPH on 3/27/2023 at 10:03 AM    Drug Interactions  None relevant to diabetes care    Adverse Drug Reactions  • Adverse Reactions Experienced: None  • Plan for ADR Management: Not Required    Hospitalizations and Urgent Care Since Last Assessment  • Hospitalizations or Admissions: None  • ED Visits: None   • Urgent Office Visits: None    Adherence and Self-Administration  Adherence related patient's specialty therapy was discussed with the patient. The Adherence segment of this outreach has been reviewed and updated.      • Ongoing or New Barriers to Patient Adherence and/or Self-Administration: None  • Methods for Supporting Patient Adherence and/or Self-Administration: None required    Goals of Therapy  Goals related to the patient's specialty therapy was discussed with the patient. The Patient Goals segment of this outreach has been reviewed and updated.    Goals     • Consistently take medications as prescribed     • HEMOGLOBIN A1C < 7            Quality of Life Assessment   Quality of Life related to the patient's specialty therapy was discussed with the patient. The QOL segment of this outreach has been reviewed and updated.     Quality of Life Assessment  Quality of Life Improvement Scale: Significantly better    Reassessment Plan & Follow-Up   1. Medication Therapy Changes: None today. Patient is doing well on current therapy.   2. Additional Plans, Therapy Recommendations, or Therapy Problems to Be Addressed: Will send updated prescriptions to [employer] for shipping services.   3. Pharmacist to perform regular reassessments no more than (6) months from the previous assessment.  4. Care Coordinator to set up future refill outreaches, coordinate prescription delivery, and escalate clinical questions to pharmacist.     Attestation  I attest that the specialty medication(s) addressed above are appropriate for the patient based on my reassessment.  If the  prescribed therapy is at any point deemed not appropriate based on the current or future assessments, a consultation will be initiated with the patient's specialty care provider to determine the best course of action. The revised plan of therapy will be documented along with any additional patient education provided.     Kerri Bolaños, PharmD, [facility]  Clinical Specialty Pharmacist, Endocrinology  3/27/2023  10:07 EDT

## 2023-04-26 ENCOUNTER — SPECIALTY PHARMACY (OUTPATIENT)
Dept: PHARMACY | Facility: HOSPITAL | Age: 38
End: 2023-04-26
Payer: COMMERCIAL

## 2023-05-12 ENCOUNTER — SPECIALTY PHARMACY (OUTPATIENT)
Dept: PHARMACY | Facility: HOSPITAL | Age: 38
End: 2023-05-12
Payer: COMMERCIAL

## 2023-06-07 ENCOUNTER — SPECIALTY PHARMACY (OUTPATIENT)
Dept: PHARMACY | Facility: HOSPITAL | Age: 38
End: 2023-06-07
Payer: COMMERCIAL

## 2023-06-07 RX ORDER — BLOOD-GLUCOSE SENSOR
1 EACH MISCELLANEOUS
Qty: 6 EACH | Refills: 1 | Status: SHIPPED | OUTPATIENT
Start: 2023-06-07

## 2023-06-21 PROBLEM — S91.301A WOUND OF RIGHT FOOT: Status: ACTIVE | Noted: 2023-06-21

## 2023-08-04 ENCOUNTER — SPECIALTY PHARMACY (OUTPATIENT)
Dept: PHARMACY | Facility: HOSPITAL | Age: 38
End: 2023-08-04
Payer: COMMERCIAL

## 2023-08-31 ENCOUNTER — SPECIALTY PHARMACY (OUTPATIENT)
Dept: PHARMACY | Facility: HOSPITAL | Age: 38
End: 2023-08-31
Payer: COMMERCIAL

## 2023-09-25 ENCOUNTER — SPECIALTY PHARMACY (OUTPATIENT)
Dept: PHARMACY | Facility: HOSPITAL | Age: 38
End: 2023-09-25

## 2023-09-27 ENCOUNTER — SPECIALTY PHARMACY (OUTPATIENT)
Dept: PHARMACY | Facility: HOSPITAL | Age: 38
End: 2023-09-27
Payer: COMMERCIAL

## 2023-09-27 ENCOUNTER — OFFICE VISIT (OUTPATIENT)
Dept: ENDOCRINOLOGY | Facility: CLINIC | Age: 38
End: 2023-09-27
Payer: COMMERCIAL

## 2023-09-27 VITALS
HEART RATE: 86 BPM | HEIGHT: 75 IN | BODY MASS INDEX: 29.84 KG/M2 | WEIGHT: 240 LBS | OXYGEN SATURATION: 97 % | SYSTOLIC BLOOD PRESSURE: 146 MMHG | DIASTOLIC BLOOD PRESSURE: 86 MMHG

## 2023-09-27 DIAGNOSIS — E11.65 TYPE 2 DIABETES MELLITUS WITH HYPERGLYCEMIA, WITH LONG-TERM CURRENT USE OF INSULIN: Primary | ICD-10-CM

## 2023-09-27 DIAGNOSIS — Z79.4 TYPE 2 DIABETES MELLITUS WITH HYPERGLYCEMIA, WITH LONG-TERM CURRENT USE OF INSULIN: Primary | ICD-10-CM

## 2023-09-27 LAB
EXPIRATION DATE: NORMAL
GLUCOSE BLDC GLUCOMTR-MCNC: 168 MG/DL (ref 70–130)
HBA1C MFR BLD: 7.4 %
Lab: NORMAL

## 2023-09-27 RX ORDER — BLOOD-GLUCOSE SENSOR
1 EACH MISCELLANEOUS
Qty: 2 EACH | Refills: 5 | Status: SHIPPED | OUTPATIENT
Start: 2023-09-27

## 2023-09-27 RX ORDER — DAPAGLIFLOZIN AND METFORMIN HYDROCHLORIDE 2.5; 1 MG/1; MG/1
1 TABLET, FILM COATED, EXTENDED RELEASE ORAL DAILY
Qty: 30 TABLET | Refills: 5 | Status: SHIPPED | OUTPATIENT
Start: 2023-09-27

## 2023-09-27 RX ORDER — INSULIN DETEMIR 100 [IU]/ML
60 INJECTION, SOLUTION SUBCUTANEOUS DAILY
Qty: 15 ML | Refills: 5 | Status: SHIPPED | OUTPATIENT
Start: 2023-09-27

## 2023-09-27 RX ORDER — TIRZEPATIDE 10 MG/.5ML
10 INJECTION, SOLUTION SUBCUTANEOUS WEEKLY
Qty: 2 ML | Refills: 5 | Status: SHIPPED | OUTPATIENT
Start: 2023-09-27

## 2023-09-27 NOTE — PROGRESS NOTES
"Chief Complaint   Patient presents with    Diabetes        Bobby Medina is a 38 y.o. male had concerns including Diabetes.    T2DM.    He is doing and feeling better since switching to GLP-1 and insulin therapy separately from Soliqua.       Diabetes:  He is checking blood sugars with Lilli CGM.  Current medications for diabetes include Xigduo 2.5 mg/1,000 mg QD and Mounjaro 7.5 mg weekly, Levemir 60 units QHS. Reduced his Xigduo due to dizziness, which has improved since reducing his dose.  He rarely has hypoglycemia.      Last eye exam is utd.    The following portions of the patient's history were reviewed and updated as appropriate: allergies, current medications, past family history, past medical history, past social history, past surgical history and problem list.    Review of Systems   Constitutional:  Negative for appetite change.   Endocrine: Negative for cold intolerance, heat intolerance, polydipsia, polyphagia and polyuria.   All other systems reviewed and are negative.     Physical Exam  Vitals reviewed.   Constitutional:       Appearance: Normal appearance.   Eyes:      Extraocular Movements: Extraocular movements intact.   Cardiovascular:      Rate and Rhythm: Normal rate.   Pulmonary:      Effort: Pulmonary effort is normal.   Musculoskeletal:        Feet:    Neurological:      General: No focal deficit present.      Mental Status: He is alert and oriented to person, place, and time.   Psychiatric:         Mood and Affect: Mood normal.         Behavior: Behavior normal.         Thought Content: Thought content normal.         Judgment: Judgment normal.      /86 (BP Location: Left arm, Patient Position: Sitting, Cuff Size: Adult)   Pulse 86   Ht 190.5 cm (75\")   Wt 109 kg (240 lb)   SpO2 97%   BMI 30.00 kg/m²      Labs and imaging    CMP:  Lab Results   Component Value Date    BUN 11 11/02/2021    CREATININE 0.65 (L) 11/02/2021    EGFRIFNONA 139 11/02/2021    BCR 16.9 11/02/2021     " 11/02/2021    K 4.2 11/02/2021    CO2 26.4 11/02/2021    CALCIUM 9.6 11/02/2021    ALBUMIN 4.50 11/02/2021    BILITOT 0.5 11/02/2021    ALKPHOS 60 11/02/2021    AST 24 11/02/2021    ALT 46 (H) 11/02/2021     Lipid Panel:  Lab Results   Component Value Date    CHOL 205 (H) 11/02/2021    TRIG 148 11/02/2021    HDL 39 (L) 11/02/2021    VLDL 27 11/02/2021     (H) 11/02/2021     HbA1c:  Lab Results   Component Value Date    HGBA1C 7.4 09/27/2023    HGBA1C 8.5 06/21/2023     Glucose:    Lab Results   Component Value Date    POCGLU 168 (A) 09/27/2023     Microalbumin:  Lab Results   Component Value Date    MALBCRERATIO  06/21/2023      Comment:      Unable to calculate     TSH:  Lab Results   Component Value Date    TSH 1.240 11/02/2021       Assessment and plan  Diagnoses and all orders for this visit:    1. Type 2 diabetes mellitus with hyperglycemia, with long-term current use of insulin (Primary)  Assessment & Plan:  -Diabetes is improving with A1c down from 8.5 to 7.4.  -Increase Mounjaro 10 mg weekly.  Patient has no personal history of pancreatitis, no family history of MEN syndrome or medullary thyroid cancer. Possible side effects including nausea, bloating, other GI upset and rarely pancreatitis were discussed. He was advised to call the office with any symptoms or concerns.   -Continue Levemir 60 units QHS.  -Continue Xigduo QD.  -Continue checking BG's regularly.  Continue with Lilli CGM. 2 week data download is as follows:  Very High: 5%  High: 29%  Target Range: 66%  Low: 0%  Very Low: 0%  Trend shows overall hyperglycemia.    -Reviewed dietary and exercise guidelines. He will start implementing these.  -Discussed importance of yearly eye exams.  -S/S hypoglycemia reviewed with Rule of 15's advised.  -Diabetes will be reassessed in 6 months.    Orders:  -     POC Glycosylated Hemoglobin (Hb A1C)  -     POC Glucose, Blood         Return in about 6 months (around 3/27/2024) for Follow-up appointment,  A1C. The patient was instructed to contact the clinic with any interval questions or concerns.    This document has been electronically signed by PEDRO Shepard  September 27, 2023 16:29 EDT  Endocrinology

## 2023-09-27 NOTE — ASSESSMENT & PLAN NOTE
-Diabetes is improving with A1c down from 8.5 to 7.4.  -Increase Mounjaro 10 mg weekly.  Patient has no personal history of pancreatitis, no family history of MEN syndrome or medullary thyroid cancer. Possible side effects including nausea, bloating, other GI upset and rarely pancreatitis were discussed. He was advised to call the office with any symptoms or concerns.   -Continue Levemir 60 units QHS.  -Continue Xigduo QD.  -Continue checking BG's regularly.  Continue with Lilli CGM. 2 week data download is as follows:  Very High: 5%  High: 29%  Target Range: 66%  Low: 0%  Very Low: 0%  Trend shows overall hyperglycemia.    -Reviewed dietary and exercise guidelines. He will start implementing these.  -Discussed importance of yearly eye exams.  -S/S hypoglycemia reviewed with Rule of 15's advised.  -Diabetes will be reassessed in 6 months.

## 2023-09-27 NOTE — PROGRESS NOTES
Specialty Pharmacy Patient Management Program  Endocrinology Reassessment     Bobby Medina is a 38 y.o. male with Type 2 Diabetes enrolled in the Endocrinology Patient Management program offered by [facility]. An assessment of continued therapy appropriateness, medication adherence, and side effect incidence and management was conducted for Xigduo, Mounjaro and Levemir.     Patient is currently taking Xigduo 2.5-1,000 mg 1 tablets daily and Levemir 60 units daily, and Mounjaro 7.5 mg weekly to control BG. He reports decreasing Xigduo to 1 tablet daily on his own due to episodes of dizziness. Decreasing the dose improved symptoms. He uses FreeStyle 3 Lilli to monitor BG with readings in the 150s. He denies low BG <70. He would like to increase dose of Mounjaro today.     Patient denies personal/family history of thyroid cancer, denies history of pancreatitis, and denies issues with recurrent UTI/yeast infections.     In the past, the patient has tried:     Drug Dose Reason for Discontinuation Notes   Soliqua  Medication change                  Changes to Insurance Coverage or Financial Support  None    Relevant Past Medical History and Comorbidities  Relevant medical history and concomitant health conditions were discussed with the patient. The patient's chart has been reviewed for relevant past medical history and comorbid health conditions and updated as necessary.   Past Medical History:   Diagnosis Date    Hypercholesterolemia     Hypoglycemia     Well controlled type 2 diabetes mellitus      Social History     Socioeconomic History    Marital status:    Tobacco Use    Smoking status: Never    Smokeless tobacco: Never   Vaping Use    Vaping Use: Never used   Substance and Sexual Activity    Alcohol use: Not Currently    Drug use: Never    Sexual activity: Defer       Problem list reviewed by Kerri Bolaños RPH on 9/27/2023 at  4:08 PM    Allergies  Known allergies and reactions  were discussed with the patient. The patient's chart has been reviewed for allergy information and updated as necessary.   Patient has no known allergies.    Allergies reviewed by Kerri Bolaños RPH on 9/27/2023 at  4:07 PM    Relevant Laboratory Values  A1C Last 3 Results          11/8/2022    14:43 6/21/2023    14:55 9/27/2023    16:04   HGBA1C Last 3 Results   Hemoglobin A1C 8.2  8.5  7.4      Lab Results   Component Value Date    HGBA1C 7.4 09/27/2023     Lab Results   Component Value Date    GLUCOSE 139 (H) 11/02/2021    CALCIUM 9.6 11/02/2021     11/02/2021    K 4.2 11/02/2021    CO2 26.4 11/02/2021     11/02/2021    BUN 11 11/02/2021    CREATININE 0.65 (L) 11/02/2021    EGFRIFNONA 139 11/02/2021    BCR 16.9 11/02/2021    ANIONGAP 9.6 11/02/2021     Lab Results   Component Value Date    CHOL 205 (H) 11/02/2021    TRIG 148 11/02/2021    HDL 39 (L) 11/02/2021     (H) 11/02/2021     Microalbumin          6/21/2023    16:36   Microalbumin   Microalbumin, Urine <1.2        Current Medication List  This medication list has been reviewed with the patient and evaluated for any interactions or necessary modifications/recommendations, and updated to include all prescription medications, OTC medications, and supplements the patient is currently taking.  This list reflects what is contained in the patient's profile, which has also been marked as reviewed to communicate to other providers it is the most up to date version of the patient's current medication therapy.     Current Outpatient Medications:     Blood Glucose Monitoring Suppl (ONE TOUCH ULTRA 2) w/Device kit, Use to check blood sugar 3 times daily, Disp: 1 each, Rfl: 0    Continuous Blood Gluc Sensor (FreeStyle Lilli 3 Sensor) [facility], Change sensor every 14 days., Disp: 2 each, Rfl: 5    Dapagliflozin-metFORMIN HCl ER (Xigduo XR) 2.5-1000 MG tablet sustained-release 24 hour, Take 1 tablet by mouth Daily., Disp: 30 tablet, Rfl: 5    insulin  detemir (Levemir FlexPen) 100 UNIT/ML injection, Inject 60 Units under the skin into the appropriate area as directed Daily., Disp: 15 mL, Rfl: 5    Insulin Pen Needle (Novofine Pen Needle) 32G X 6 MM misc, Use to inject insulin once daily as directed, Disp: 100 each, Rfl: 5    Lancets (OneTouch Delica Plus [id number]) misc, Use to check blood sugar 3 times daily, Disp: 100 each, Rfl: 5    montelukast (SINGULAIR) 10 MG tablet, Take 1 tablet by mouth Every Night., Disp: 90 tablet, Rfl: 0    Tirzepatide (Mounjaro) 10 MG/0.5ML solution pen-injector, Inject 0.5 mL under the skin into the appropriate area as directed 1 (One) Time Per Week., Disp: 2 mL, Rfl: 5    Medicines reviewed by Kerri Bolaños [facility] on 9/27/2023 at  4:09 PM    Drug Interactions  No significant drug-drug interactions with diabetes medications expected according to literature.     Adverse Drug Reactions  Adverse Reactions Experienced: Dizziness with Xigduo  Plan for ADR Management: Pharmacist to consult provider    Hospitalizations and Urgent Care Since Last Assessment  Hospitalizations or Admissions: None  ED Visits: None   Urgent Office Visits: None    Adherence and Self-Administration  Adherence related patient's specialty therapy was discussed with the patient. The Adherence segment of this outreach has been reviewed and updated.      Ongoing or New Barriers to Patient Adherence and/or Self-Administration: None  Methods for Supporting Patient Adherence and/or Self-Administration: None required    Goals of Therapy  Goals related to the patient's specialty therapy was discussed with the patient. The Patient Goals segment of this outreach has been reviewed and updated.    Goals Addressed Today        Consistently take medications as prescribed      HEMOGLOBIN A1C < 7      Specialty Pharmacy General Goal      Prevent hypoglycemia          Reassessment Plan & Follow-Up   Patient's diabetes has improved with A1C down to 7.4% from 8.5%.  Medication Therapy  Changes: Per Suly Cisse, APRN  Increase to Mounjaro 10 mg weekly   Continue Levemir 60 units daily   Decrease to Xigduo 2.5-1000 mg 1 tablet daily   Additional Plans, Therapy Recommendations, or Therapy Problems to Be Addressed: Will send updated prescriptions to [facility] for shipping services.   Pharmacist to perform regular reassessments no more than (6) months from the previous assessment.  Care Coordinator to set up future refill outreaches, coordinate prescription delivery, and escalate clinical questions to pharmacist.     Attestation  I attest that the specialty medication(s) addressed above are appropriate for the patient based on my reassessment.  If the prescribed therapy is at any point deemed not appropriate based on the current or future assessments, a consultation will be initiated with the patient's specialty care provider to determine the best course of action. The revised plan of therapy will be documented along with any additional patient education provided.     Kerri Bolaños, PharmD, [facility]  Clinical Specialty Pharmacist, Endocrinology  9/27/2023  16:31 EDT

## 2023-10-23 ENCOUNTER — SPECIALTY PHARMACY (OUTPATIENT)
Dept: PHARMACY | Facility: HOSPITAL | Age: 38
End: 2023-10-23
Payer: COMMERCIAL

## 2023-10-23 RX ORDER — MONTELUKAST SODIUM 10 MG/1
10 TABLET ORAL NIGHTLY
Qty: 90 TABLET | Refills: 0 | Status: SHIPPED | OUTPATIENT
Start: 2023-10-23

## 2023-11-16 ENCOUNTER — SPECIALTY PHARMACY (OUTPATIENT)
Dept: PHARMACY | Facility: HOSPITAL | Age: 38
End: 2023-11-16
Payer: COMMERCIAL

## 2023-12-14 ENCOUNTER — SPECIALTY PHARMACY (OUTPATIENT)
Dept: PHARMACY | Facility: HOSPITAL | Age: 38
End: 2023-12-14
Payer: COMMERCIAL

## 2023-12-14 RX ORDER — METFORMIN HYDROCHLORIDE 500 MG/1
1000 TABLET, EXTENDED RELEASE ORAL
Qty: 60 TABLET | Refills: 2 | Status: SHIPPED | OUTPATIENT
Start: 2023-12-14 | End: 2023-12-14 | Stop reason: SDUPTHER

## 2023-12-14 RX ORDER — METFORMIN HYDROCHLORIDE 500 MG/1
1000 TABLET, EXTENDED RELEASE ORAL
Qty: 60 TABLET | Refills: 2 | Status: SHIPPED | OUTPATIENT
Start: 2023-12-14

## 2023-12-14 NOTE — PROGRESS NOTES
Metformin XR sent to pharmacy for patient to start taking.  He can stop the Xigduo.  He will need to monitor his BG's and notify if they are increasing as he may need a medication adjustment.

## 2024-01-12 ENCOUNTER — SPECIALTY PHARMACY (OUTPATIENT)
Dept: PHARMACY | Facility: HOSPITAL | Age: 39
End: 2024-01-12
Payer: COMMERCIAL

## 2024-01-12 RX ORDER — INSULIN GLARGINE 300 U/ML
60 INJECTION, SOLUTION SUBCUTANEOUS NIGHTLY
Qty: 18 ML | Refills: 1 | Status: SHIPPED | OUTPATIENT
Start: 2024-01-12

## 2024-01-12 RX ORDER — PEN NEEDLE, DIABETIC 32 GX 1/4"
1 NEEDLE, DISPOSABLE MISCELLANEOUS DAILY
Qty: 100 EACH | Refills: 5 | Status: SHIPPED | OUTPATIENT
Start: 2024-01-12

## 2024-01-12 NOTE — PROGRESS NOTES
Specialty Pharmacy Patient Management Program  Endocrinology Reassessment     Bobby Medina is a 38 y.o. male with Type 2 Diabetes enrolled in the Endocrinology Patient Management program offered by [facility]. An assessment of continued therapy appropriateness, medication adherence, and side effect incidence and management was conducted for Mounjaro and Levemir.     Patient is currently taking metformin 1,000 mg daily, Levemir 60 units daily, and Mounjaro 10 mg weekly to control BG. Patient's insurance is requiring the change from Levemir to Toujeo for coverage.     Patient denies personal/family history of thyroid cancer, denies history of pancreatitis, and denies issues with recurrent UTI/yeast infections.     In the past, the patient has tried:     Drug Dose Reason for Discontinuation Notes   Soliqua  Medication change                  Changes to Insurance Coverage or Financial Support  None    Relevant Past Medical History and Comorbidities  Relevant medical history and concomitant health conditions were discussed with the patient. The patient's chart has been reviewed for relevant past medical history and comorbid health conditions and updated as necessary.   Past Medical History:   Diagnosis Date    Hypercholesterolemia     Hypoglycemia     Well controlled type 2 diabetes mellitus      Social History     Socioeconomic History    Marital status:    Tobacco Use    Smoking status: Never    Smokeless tobacco: Never   Vaping Use    Vaping Use: Never used   Substance and Sexual Activity    Alcohol use: Not Currently    Drug use: Never    Sexual activity: Defer       Problem list reviewed by Kerri Bolaños RPH on 1/12/2024 at 12:06 PM    Allergies  Known allergies and reactions were discussed with the patient. The patient's chart has been reviewed for allergy information and updated as necessary.   Patient has no known allergies.    Allergies reviewed by Kerri Bolaños RPH on  1/12/2024 at 12:06 PM    Relevant Laboratory Values  A1C Last 3 Results          6/21/2023    14:55 9/27/2023    16:04   HGBA1C Last 3 Results   Hemoglobin A1C 8.5  7.4      Lab Results   Component Value Date    HGBA1C 7.4 09/27/2023     Lab Results   Component Value Date    GLUCOSE 139 (H) 11/02/2021    CALCIUM 9.6 11/02/2021     11/02/2021    K 4.2 11/02/2021    CO2 26.4 11/02/2021     11/02/2021    BUN 11 11/02/2021    CREATININE 0.65 (L) 11/02/2021    EGFRIFNONA 139 11/02/2021    BCR 16.9 11/02/2021    ANIONGAP 9.6 11/02/2021     Lab Results   Component Value Date    CHOL 205 (H) 11/02/2021    TRIG 148 11/02/2021    HDL 39 (L) 11/02/2021     (H) 11/02/2021     Microalbumin          6/21/2023    16:36   Microalbumin   Microalbumin, Urine <1.2        Current Medication List  This medication list has been reviewed with the patient and evaluated for any interactions or necessary modifications/recommendations, and updated to include all prescription medications, OTC medications, and supplements the patient is currently taking.  This list reflects what is contained in the patient's profile, which has also been marked as reviewed to communicate to other providers it is the most up to date version of the patient's current medication therapy.     Current Outpatient Medications:     Blood Glucose Monitoring Suppl (ONE TOUCH ULTRA 2) w/Device kit, Use to check blood sugar 3 times daily, Disp: 1 each, Rfl: 0    Continuous Blood Gluc Sensor (FreeStyle Lilli 3 Sensor) [facility], Change sensor every 14 days., Disp: 2 each, Rfl: 5    Insulin Glargine, 1 Unit Dial, (Toujeo SoloStar) 300 UNIT/ML solution pen-injector injection, Inject 60 Units under the skin into the appropriate area as directed Every Night., Disp: 18 mL, Rfl: 1    Insulin Pen Needle (Novofine Pen Needle) 32G X 6 MM misc, Use to inject insulin once daily as directed, Disp: 100 each, Rfl: 5    Lancets (OneTouch Delica Plus [id number]) misc, Use to  check blood sugar 3 times daily, Disp: 100 each, Rfl: 5    metFORMIN ER (GLUCOPHAGE-XR) 500 MG 24 hr tablet, Take 2 tablets by mouth Daily With Breakfast., Disp: 60 tablet, Rfl: 2    montelukast (SINGULAIR) 10 MG tablet, Take 1 tablet by mouth Every Night., Disp: 90 tablet, Rfl: 0    Tirzepatide (Mounjaro) 10 MG/0.5ML solution pen-injector pen, Inject 0.5 mL under the skin into the appropriate area as directed 1 (One) Time Per Week., Disp: 2 mL, Rfl: 5    Medicines reviewed by Kerri Bolaños RP on 1/12/2024 at 12:06 PM    Drug Interactions  No significant drug-drug interactions with diabetes medications expected according to literature.     Adverse Drug Reactions  Adverse Reactions Experienced: None  Plan for ADR Management: None required    Hospitalizations and Urgent Care Since Last Assessment  Hospitalizations or Admissions: None  ED Visits: None   Urgent Office Visits: None    Adherence and Self-Administration  Adherence related patient's specialty therapy was discussed with the patient. The Adherence segment of this outreach has been reviewed and updated.      Ongoing or New Barriers to Patient Adherence and/or Self-Administration: None  Methods for Supporting Patient Adherence and/or Self-Administration: None required    Goals of Therapy  Goals related to the patient's specialty therapy was discussed with the patient. The Patient Goals segment of this outreach has been reviewed and updated.    Goals Addressed Today        Consistently take medications as prescribed           Reassessment Plan & Follow-Up   Patient's insurance is requiring the change from Levemir to Toujeo for coverage.   Medication Therapy Changes: Per PEDRO Shepard  Discontinue Levemir   Start Toujeo 60 units QHS  Additional Plans, Therapy Recommendations, or Therapy Problems to Be Addressed: Will send updated prescriptions to [facility] for shipping services.   Pharmacist to perform regular reassessments no more than (6) months  from the previous assessment.  Care Coordinator to set up future refill outreaches, coordinate prescription delivery, and escalate clinical questions to pharmacist.     Attestation  I attest the patient was actively involved in and has agreed to the above plan of care. If the prescribed therapy is at any point deemed not appropriate based on the current or future assessments, a consultation will be initiated with the patient's specialty care provider to determine the best course of action. The revised plan of therapy will be documented along with any required assessments and/or additional patient education provided.     Kerri Bolaños, PharmD, [facility]  Clinical Specialty Pharmacist, Endocrinology  1/12/2024  12:07 EST

## 2024-03-27 ENCOUNTER — OFFICE VISIT (OUTPATIENT)
Dept: ENDOCRINOLOGY | Facility: CLINIC | Age: 39
End: 2024-03-27
Payer: COMMERCIAL

## 2024-03-27 ENCOUNTER — SPECIALTY PHARMACY (OUTPATIENT)
Dept: ENDOCRINOLOGY | Facility: CLINIC | Age: 39
End: 2024-03-27
Payer: COMMERCIAL

## 2024-03-27 VITALS
HEART RATE: 79 BPM | DIASTOLIC BLOOD PRESSURE: 93 MMHG | HEIGHT: 75 IN | OXYGEN SATURATION: 98 % | SYSTOLIC BLOOD PRESSURE: 158 MMHG | BODY MASS INDEX: 31.65 KG/M2 | WEIGHT: 254.6 LBS

## 2024-03-27 DIAGNOSIS — Z79.4 TYPE 2 DIABETES MELLITUS WITH HYPERGLYCEMIA, WITH LONG-TERM CURRENT USE OF INSULIN: Primary | ICD-10-CM

## 2024-03-27 DIAGNOSIS — Z79.4 TYPE 2 DIABETES MELLITUS WITH HYPERGLYCEMIA, WITH LONG-TERM CURRENT USE OF INSULIN: ICD-10-CM

## 2024-03-27 DIAGNOSIS — E11.65 TYPE 2 DIABETES MELLITUS WITH HYPERGLYCEMIA, WITH LONG-TERM CURRENT USE OF INSULIN: ICD-10-CM

## 2024-03-27 DIAGNOSIS — E11.65 TYPE 2 DIABETES MELLITUS WITH HYPERGLYCEMIA, WITH LONG-TERM CURRENT USE OF INSULIN: Primary | ICD-10-CM

## 2024-03-27 LAB
EXPIRATION DATE: ABNORMAL
HBA1C MFR BLD: 8.7 % (ref 4.5–5.7)
Lab: ABNORMAL

## 2024-03-27 PROCEDURE — 83036 HEMOGLOBIN GLYCOSYLATED A1C: CPT | Performed by: NURSE PRACTITIONER

## 2024-03-27 PROCEDURE — 99214 OFFICE O/P EST MOD 30 MIN: CPT | Performed by: NURSE PRACTITIONER

## 2024-03-27 RX ORDER — LISINOPRIL 10 MG/1
10 TABLET ORAL DAILY
Qty: 90 TABLET | Refills: 2 | Status: SHIPPED | OUTPATIENT
Start: 2024-03-27 | End: 2024-03-27 | Stop reason: SDUPTHER

## 2024-03-27 RX ORDER — LISINOPRIL 10 MG/1
10 TABLET ORAL DAILY
Qty: 90 TABLET | Refills: 2 | Status: SHIPPED | OUTPATIENT
Start: 2024-03-27

## 2024-03-27 RX ORDER — TIRZEPATIDE 10 MG/.5ML
10 INJECTION, SOLUTION SUBCUTANEOUS WEEKLY
Qty: 2 ML | Refills: 5 | Status: SHIPPED | OUTPATIENT
Start: 2024-03-27

## 2024-03-27 RX ORDER — INSULIN GLARGINE 300 U/ML
80 INJECTION, SOLUTION SUBCUTANEOUS NIGHTLY
Qty: 18 ML | Refills: 1 | Status: SHIPPED | OUTPATIENT
Start: 2024-03-27

## 2024-03-27 RX ORDER — METFORMIN HYDROCHLORIDE 500 MG/1
1000 TABLET, EXTENDED RELEASE ORAL
Qty: 60 TABLET | Refills: 2 | Status: SHIPPED | OUTPATIENT
Start: 2024-03-27

## 2024-03-27 NOTE — PROGRESS NOTES
"Chief Complaint   Patient presents with    Diabetes        Bobby Medina is a 39 y.o. male had concerns including Diabetes.    T2DM.    He has been out of his Mounjaro for the last 2 months due to increased cost.  He has continued taking his Xigduo and Levemir during this time.    Diabetes:  He is checking blood sugars with Lilli CGM.  Current medications for diabetes include Xigduo 2.5 mg/1,000 mg QD and Mounjaro 7.5 mg weekly-has not had any in the last 2 months, Levemir 60 units QHS.   He rarely has hypoglycemia.      Last eye exam is utd.    The following portions of the patient's history were reviewed and updated as appropriate: allergies, current medications, past family history, past medical history, past social history, past surgical history and problem list.    Review of Systems   Constitutional:  Negative for appetite change.   Endocrine: Negative for cold intolerance, heat intolerance, polydipsia, polyphagia and polyuria.   All other systems reviewed and are negative.       Physical Exam  Vitals reviewed.   Constitutional:       Appearance: Normal appearance.   Eyes:      Extraocular Movements: Extraocular movements intact.   Cardiovascular:      Rate and Rhythm: Normal rate.   Pulmonary:      Effort: Pulmonary effort is normal.   Musculoskeletal:        Feet:    Neurological:      General: No focal deficit present.      Mental Status: He is alert and oriented to person, place, and time.   Psychiatric:         Mood and Affect: Mood normal.         Behavior: Behavior normal.         Thought Content: Thought content normal.         Judgment: Judgment normal.        /93 (BP Location: Right arm, Patient Position: Sitting, Cuff Size: Adult)   Pulse 79   Ht 190.5 cm (75\")   Wt 115 kg (254 lb 9.6 oz)   SpO2 98%   BMI 31.82 kg/m²      Labs and imaging    CMP:  Lab Results   Component Value Date    BUN 11 11/02/2021    CREATININE 0.65 (L) 11/02/2021    EGFRIFNONA 139 11/02/2021    BCR 16.9 11/02/2021    "  11/02/2021    K 4.2 11/02/2021    CO2 26.4 11/02/2021    CALCIUM 9.6 11/02/2021    ALBUMIN 4.50 11/02/2021    BILITOT 0.5 11/02/2021    ALKPHOS 60 11/02/2021    AST 24 11/02/2021    ALT 46 (H) 11/02/2021     Lipid Panel:  Lab Results   Component Value Date    CHOL 205 (H) 11/02/2021    TRIG 148 11/02/2021    HDL 39 (L) 11/02/2021    VLDL 27 11/02/2021     (H) 11/02/2021     HbA1c:  Lab Results   Component Value Date    HGBA1C 8.7 (A) 03/27/2024    HGBA1C 7.4 09/27/2023     Glucose:    Lab Results   Component Value Date    POCGLU 168 (A) 09/27/2023     Microalbumin:  Lab Results   Component Value Date    MALBCRERATIO  06/21/2023      Comment:      Unable to calculate     TSH:  Lab Results   Component Value Date    TSH 1.240 11/02/2021       Assessment and plan  Diagnoses and all orders for this visit:    1. Type 2 diabetes mellitus with hyperglycemia, with long-term current use of insulin (Primary)  Assessment & Plan:  -Diabetes is worsening with A1c up from 7.4 to 8.7.  -Start back on Mounjaro 10 mg weekly.  Patient has no personal history of pancreatitis, no family history of MEN syndrome or medullary thyroid cancer. Possible side effects including nausea, bloating, other GI upset and rarely pancreatitis were discussed. He was advised to call the office with any symptoms or concerns.   -Increase Levemir 80 units QHS until he is consistently back on his GLP-1.  -Continue Xigduo QD.  -Continue checking BG's regularly.    -Reviewed dietary and exercise guidelines. He will start implementing these.  -Discussed importance of yearly eye exams.  -S/S hypoglycemia reviewed with Rule of 15's advised.  -Diabetes will be reassessed in 3 months.    Orders:  -     POC Glycosylated Hemoglobin (Hb A1C)    Other orders  -     lisinopril (PRINIVIL,ZESTRIL) 10 MG tablet; Take 1 tablet by mouth Daily.  Dispense: 90 tablet; Refill: 2         Return in about 3 months (around 6/27/2024) for Follow-up appointment, A1C. The  patient was instructed to contact the clinic with any interval questions or concerns.    This document has been electronically signed by PEDRO Shepard  March 27, 2024 14:08 EDT  Endocrinology

## 2024-03-27 NOTE — ASSESSMENT & PLAN NOTE
-Diabetes is worsening with A1c up from 7.4 to 8.7.  -Start back on Mounjaro 10 mg weekly.  Patient has no personal history of pancreatitis, no family history of MEN syndrome or medullary thyroid cancer. Possible side effects including nausea, bloating, other GI upset and rarely pancreatitis were discussed. He was advised to call the office with any symptoms or concerns.   -Increase Levemir 80 units QHS until he is consistently back on his GLP-1.  -Continue Xigduo QD.  -Continue checking BG's regularly.    -Reviewed dietary and exercise guidelines. He will start implementing these.  -Discussed importance of yearly eye exams.  -S/S hypoglycemia reviewed with Rule of 15's advised.  -Diabetes will be reassessed in 3 months.

## 2024-03-27 NOTE — PROGRESS NOTES
Specialty Pharmacy Patient Management Program  Endocrinology Reassessment     Bobby Medina is a 39 y.o. male with Type 2 Diabetes enrolled in the Endocrinology Patient Management program offered by [facility]. An assessment of continued therapy appropriateness, medication adherence, and side effect incidence and management was conducted for Mounjaro and Toujeo.     Patient is currently taking metformin 1,000 mg daily, Toujeo 60 units daily, and Mounjaro 10 mg weekly to control BG. He has not been able to take Mounjaro over the last month due to cost issues.     Patient denies personal/family history of thyroid cancer, denies history of pancreatitis, and denies issues with recurrent UTI/yeast infections.     In the past, the patient has tried:     Drug Dose Reason for Discontinuation Notes   Soliqua  Medication change    Levemir  Med change due to insurance            Changes to Insurance Coverage or Financial Support  None    Relevant Past Medical History and Comorbidities  Relevant medical history and concomitant health conditions were discussed with the patient. The patient's chart has been reviewed for relevant past medical history and comorbid health conditions and updated as necessary.   Past Medical History:   Diagnosis Date    Hypercholesterolemia     Hypoglycemia     Well controlled type 2 diabetes mellitus      Social History     Socioeconomic History    Marital status:    Tobacco Use    Smoking status: Never    Smokeless tobacco: Never   Vaping Use    Vaping status: Never Used   Substance and Sexual Activity    Alcohol use: Not Currently    Drug use: Never    Sexual activity: Defer       Problem list reviewed by Jeimy Rebollar, PharmD on 3/27/2024 at  1:39 PM    Allergies  Known allergies and reactions were discussed with the patient. The patient's chart has been reviewed for allergy information and updated as necessary.   Patient has no known allergies.    Allergies  reviewed by Jeimy Rebollar PharmD on 3/27/2024 at 11:10 AM    Relevant Laboratory Values  A1C Last 3 Results          6/21/2023    14:55 9/27/2023    16:04 3/27/2024    11:08   HGBA1C Last 3 Results   Hemoglobin A1C 8.5  7.4  8.7      Lab Results   Component Value Date    HGBA1C 8.7 (A) 03/27/2024     Lab Results   Component Value Date    GLUCOSE 139 (H) 11/02/2021    CALCIUM 9.6 11/02/2021     11/02/2021    K 4.2 11/02/2021    CO2 26.4 11/02/2021     11/02/2021    BUN 11 11/02/2021    CREATININE 0.65 (L) 11/02/2021    EGFRIFNONA 139 11/02/2021    BCR 16.9 11/02/2021    ANIONGAP 9.6 11/02/2021     Lab Results   Component Value Date    CHOL 205 (H) 11/02/2021    TRIG 148 11/02/2021    HDL 39 (L) 11/02/2021     (H) 11/02/2021     Microalbumin          6/21/2023    16:36   Microalbumin   Microalbumin, Urine <1.2        Current Medication List  This medication list has been reviewed with the patient and evaluated for any interactions or necessary modifications/recommendations, and updated to include all prescription medications, OTC medications, and supplements the patient is currently taking.  This list reflects what is contained in the patient's profile, which has also been marked as reviewed to communicate to other providers it is the most up to date version of the patient's current medication therapy.     Current Outpatient Medications:     Blood Glucose Monitoring Suppl (ONE TOUCH ULTRA 2) w/Device kit, Use to check blood sugar 3 times daily, Disp: 1 each, Rfl: 0    Continuous Blood Gluc Sensor (FreeStyle Lilli 3 Sensor) misc, Change sensor every 14 days., Disp: 2 each, Rfl: 5    Insulin Glargine, 1 Unit Dial, (Toujeo SoloStar) 300 UNIT/ML solution pen-injector injection, Inject 80 Units under the skin into the appropriate area as directed Every Night., Disp: 18 mL, Rfl: 1    Insulin Pen Needle (Novofine Pen Needle) 32G X 6 MM misc, Use to inject insulin once daily as directed, Disp: 100  "each, Rfl: 5    Insulin Syringe-Needle U-100 32G X 5/16\" 1 ML misc, Use 1 each 3 times a day., Disp: 100 each, Rfl: 2    Lancets (OneTouch Delica Plus [id number]) misc, Use to check blood sugar 3 times daily, Disp: 100 each, Rfl: 5    metFORMIN ER (GLUCOPHAGE-XR) 500 MG 24 hr tablet, Take 2 tablets by mouth Daily With Breakfast., Disp: 60 tablet, Rfl: 2    montelukast (SINGULAIR) 10 MG tablet, Take 1 tablet by mouth Every Night., Disp: 90 tablet, Rfl: 0    Tirzepatide (Mounjaro) 10 MG/0.5ML solution pen-injector pen, Inject 0.5 mL under the skin into the appropriate area as directed 1 (One) Time Per Week., Disp: 2 mL, Rfl: 5    lisinopril (PRINIVIL,ZESTRIL) 10 MG tablet, Take 1 tablet by mouth Daily., Disp: 90 tablet, Rfl: 2    Medicines reviewed by Jeimy Rebollar, PharmD on 3/27/2024 at 11:11 AM    Drug Interactions  No significant drug-drug interactions with diabetes medications expected according to literature.     Adverse Drug Reactions  Adverse Reactions Experienced: None  Plan for ADR Management: None required    Hospitalizations and Urgent Care Since Last Assessment  Hospitalizations or Admissions: None  ED Visits: None   Urgent Office Visits: None    Adherence and Self-Administration  Adherence related patient's specialty therapy was discussed with the patient. The Adherence segment of this outreach has been reviewed and updated.      Ongoing or New Barriers to Patient Adherence and/or Self-Administration: None  Methods for Supporting Patient Adherence and/or Self-Administration: None required    Goals of Therapy  Goals related to the patient's specialty therapy was discussed with the patient. The Patient Goals segment of this outreach has been reviewed and updated.    Goals Addressed Today        Consistently take medications as prescribed      Specialty Pharmacy General Goal      Prevent hypoglycemia            Reassessment Plan & Follow-Up   Patient's insurance is requiring the change from Levemir to " Toujeo for coverage.   Medication Therapy Changes: Per PEDRO Shepard  Increase Toujeo 80 units QHS  Additional Plans, Therapy Recommendations, or Therapy Problems to Be Addressed: Will send updated prescriptions to [facility] for shipping services.   Pharmacist to perform regular reassessments no more than (6) months from the previous assessment.  Care Coordinator to set up future refill outreaches, coordinate prescription delivery, and escalate clinical questions to pharmacist.     Attestation  I attest the patient was actively involved in and has agreed to the above plan of care. If the prescribed therapy is at any point deemed not appropriate based on the current or future assessments, a consultation will be initiated with the patient's specialty care provider to determine the best course of action. The revised plan of therapy will be documented along with any required assessments and/or additional patient education provided.     Jeimy Rebollar, PharmD  Clinical Specialty Pharmacist, Endocrinology  3/27/2024  13:41 EDT

## 2024-04-23 ENCOUNTER — SPECIALTY PHARMACY (OUTPATIENT)
Dept: PHARMACY | Facility: HOSPITAL | Age: 39
End: 2024-04-23
Payer: COMMERCIAL

## 2024-04-23 NOTE — PROGRESS NOTES
Specialty Pharmacy Refill Coordination Note     Bobby is a 39 y.o. male contacted today regarding refills of  Mounjaro specialty medication(s).    Reviewed and verified with patient:       Specialty medication(s) and dose(s) confirmed: yes    Refill Questions      Flowsheet Row Most Recent Value   Changes to allergies? No   Changes to medications? No   New conditions or infections since last clinic visit No   Unplanned office visit, urgent care, ED, or hospital admission in the last 4 weeks  No   How does patient/caregiver feel medication is working? Very good   Financial problems or insurance changes  No   If yes, describe changes in insurance or financial issues. na   Since the previous refill, were any specialty medication doses or scheduled injections missed or delayed?  No   If yes, please provide the amount na   Why were doses missed? na   Does this patient require a clinical escalation to a pharmacist? No            Delivery Questions      Flowsheet Row Most Recent Value   Copay verified? No   Copay amount na   Copay form of payment No copayment ($0)                   Follow-up: 30 day(s)     Verona Reed, Pharmacy Technician  Specialty Pharmacy Technician

## 2024-05-15 ENCOUNTER — SPECIALTY PHARMACY (OUTPATIENT)
Dept: PHARMACY | Facility: HOSPITAL | Age: 39
End: 2024-05-15
Payer: COMMERCIAL

## 2024-05-15 NOTE — PROGRESS NOTES
Specialty Pharmacy Refill Coordination Note     Bobby is a 39 y.o. male contacted today regarding refills of  Mounjaro, Metformin, Toujeospecialty medication(s).    Reviewed and verified with patient:       Specialty medication(s) and dose(s) confirmed: yes    Refill Questions      Flowsheet Row Most Recent Value   Changes to allergies? No   Changes to medications? No   New conditions or infections since last clinic visit No   Unplanned office visit, urgent care, ED, or hospital admission in the last 4 weeks  No   How does patient/caregiver feel medication is working? Very good   Financial problems or insurance changes  No   Since the previous refill, were any specialty medication doses or scheduled injections missed or delayed?  No   Why were doses missed? na   Does this patient require a clinical escalation to a pharmacist? No            Delivery Questions      Flowsheet Row Most Recent Value   Copay verified? Yes   Copay amount 105.90   Copay form of payment Credit/debit on file                   Follow-up: 30 day(s)     Verona Reed, Pharmacy Technician  Specialty Pharmacy Technician

## 2024-06-14 ENCOUNTER — SPECIALTY PHARMACY (OUTPATIENT)
Dept: PHARMACY | Facility: HOSPITAL | Age: 39
End: 2024-06-14
Payer: COMMERCIAL

## 2024-06-19 DIAGNOSIS — Z79.4 TYPE 2 DIABETES MELLITUS WITH HYPERGLYCEMIA, WITH LONG-TERM CURRENT USE OF INSULIN: ICD-10-CM

## 2024-06-19 DIAGNOSIS — E11.65 TYPE 2 DIABETES MELLITUS WITH HYPERGLYCEMIA, WITH LONG-TERM CURRENT USE OF INSULIN: ICD-10-CM

## 2024-06-20 RX ORDER — BLOOD-GLUCOSE SENSOR
EACH MISCELLANEOUS
Qty: 90 EACH | Refills: 3 | Status: SHIPPED | OUTPATIENT
Start: 2024-06-20

## 2024-07-09 ENCOUNTER — SPECIALTY PHARMACY (OUTPATIENT)
Dept: PHARMACY | Facility: HOSPITAL | Age: 39
End: 2024-07-09
Payer: COMMERCIAL

## 2024-07-09 RX ORDER — METFORMIN HYDROCHLORIDE 500 MG/1
1000 TABLET, EXTENDED RELEASE ORAL
Qty: 60 TABLET | Refills: 2 | Status: CANCELLED | OUTPATIENT
Start: 2024-07-09

## 2024-07-09 NOTE — PROGRESS NOTES
Specialty Pharmacy Refill Coordination Note     Bobby is a 39 y.o. male contacted today regarding refills of  Lisinopril, metformin, mounjaro, toujeo specialty medication(s).    Reviewed and verified with patient:       Specialty medication(s) and dose(s) confirmed: yes    Refill Questions      Flowsheet Row Most Recent Value   Changes to allergies? No   Changes to medications? No   New conditions or infections since last clinic visit No   Unplanned office visit, urgent care, ED, or hospital admission in the last 4 weeks  No   How does patient/caregiver feel medication is working? Very good   Financial problems or insurance changes  No   Since the previous refill, were any specialty medication doses or scheduled injections missed or delayed?  No   Why were doses missed? na   Does this patient require a clinical escalation to a pharmacist? No            Delivery Questions      Flowsheet Row Most Recent Value   Copay verified? No   Copay amount na   Copay form of payment No copayment ($0)                   Follow-up: 30 day(s)     Verona Reed, Pharmacy Technician  Specialty Pharmacy Technician

## 2024-07-10 ENCOUNTER — SPECIALTY PHARMACY (OUTPATIENT)
Dept: PHARMACY | Facility: HOSPITAL | Age: 39
End: 2024-07-10
Payer: COMMERCIAL

## 2024-07-10 DIAGNOSIS — Z79.4 TYPE 2 DIABETES MELLITUS WITH HYPERGLYCEMIA, WITH LONG-TERM CURRENT USE OF INSULIN: Primary | ICD-10-CM

## 2024-07-10 DIAGNOSIS — E11.65 TYPE 2 DIABETES MELLITUS WITH HYPERGLYCEMIA, WITH LONG-TERM CURRENT USE OF INSULIN: Primary | ICD-10-CM

## 2024-07-10 RX ORDER — METFORMIN HYDROCHLORIDE 500 MG/1
1000 TABLET, EXTENDED RELEASE ORAL
Qty: 60 TABLET | Refills: 5 | Status: SHIPPED | OUTPATIENT
Start: 2024-07-10

## 2024-07-10 RX ORDER — INSULIN GLARGINE 300 U/ML
80 INJECTION, SOLUTION SUBCUTANEOUS NIGHTLY
Qty: 18 ML | Refills: 2 | Status: SHIPPED | OUTPATIENT
Start: 2024-07-10

## 2024-07-10 NOTE — PROGRESS NOTES
Specialty Pharmacy Patient Management Program  Endocrinology Reassessment     Bobby Medina is a 39 y.o. male with Type 2 Diabetes enrolled in the Endocrinology Patient Management program offered by [facility]. An assessment of continued therapy appropriateness, medication adherence, and side effect incidence and management was conducted for Mounjaro, Metformin and insulin therapy.     Patient is currently taking metformin 1,000 mg daily, Toujeo 800 units daily, and Mounjaro 7.5 mg weekly to control BG. Patient's needs refills on diabetes medications today. He denies any issues with current therapy, denies any missed doses, and denies any episodes of hypoglycemia.     Patient denies personal/family history of thyroid cancer, denies history of pancreatitis, and denies issues with recurrent UTI/yeast infections.     In the past, the patient has tried:     Drug Dose Reason for Discontinuation Notes   Soliqua  Medication change                  Changes to Insurance Coverage or Financial Support  None    Relevant Past Medical History and Comorbidities  Relevant medical history and concomitant health conditions were discussed with the patient. The patient's chart has been reviewed for relevant past medical history and comorbid health conditions and updated as necessary.   Past Medical History:   Diagnosis Date    Hypercholesterolemia     Hypoglycemia     Well controlled type 2 diabetes mellitus      Social History     Socioeconomic History    Marital status:    Tobacco Use    Smoking status: Never    Smokeless tobacco: Never   Vaping Use    Vaping status: Never Used   Substance and Sexual Activity    Alcohol use: Not Currently    Drug use: Never    Sexual activity: Defer       Problem list reviewed by Kerri Bolaños, PharmD on 7/10/2024 at 10:55 AM    Allergies  Known allergies and reactions were discussed with the patient. The patient's chart has been reviewed for allergy information and  updated as necessary.   Patient has no known allergies.    Allergies reviewed by Kerri Bolaños, PharmD on 7/10/2024 at 10:43 AM    Relevant Laboratory Values  A1C Last 3 Results          9/27/2023    16:04 3/27/2024    11:08   HGBA1C Last 3 Results   Hemoglobin A1C 7.4  8.7      Lab Results   Component Value Date    HGBA1C 8.7 (A) 03/27/2024     Lab Results   Component Value Date    GLUCOSE 139 (H) 11/02/2021    CALCIUM 9.6 11/02/2021     11/02/2021    K 4.2 11/02/2021    CO2 26.4 11/02/2021     11/02/2021    BUN 11 11/02/2021    CREATININE 0.65 (L) 11/02/2021    EGFRIFNONA 139 11/02/2021    BCR 16.9 11/02/2021    ANIONGAP 9.6 11/02/2021     Lab Results   Component Value Date    CHOL 205 (H) 11/02/2021    TRIG 148 11/02/2021    HDL 39 (L) 11/02/2021     (H) 11/02/2021     Current Medication List  This medication list has been reviewed with the patient and evaluated for any interactions or necessary modifications/recommendations, and updated to include all prescription medications, OTC medications, and supplements the patient is currently taking.  This list reflects what is contained in the patient's profile, which has also been marked as reviewed to communicate to other providers it is the most up to date version of the patient's current medication therapy.     Current Outpatient Medications:     Insulin Glargine, 1 Unit Dial, (Toujeo SoloStar) 300 UNIT/ML solution pen-injector injection, Inject 80 Units under the skin into the appropriate area as directed Every Night., Disp: 18 mL, Rfl: 2    metFORMIN ER (GLUCOPHAGE-XR) 500 MG 24 hr tablet, Take 2 tablets by mouth Daily With Breakfast., Disp: 60 tablet, Rfl: 5    Tirzepatide (Mounjaro) 7.5 MG/0.5ML solution pen-injector pen, Inject 0.5 mL under the skin into the appropriate area as directed 1 (One) Time Per Week., Disp: 2 mL, Rfl: 5    Blood Glucose Monitoring Suppl (ONE TOUCH ULTRA 2) w/Device kit, Use to check blood sugar 3 times daily, Disp: 1  "each, Rfl: 0    Continuous Glucose Sensor (FreeStyle Lilli 3 Sensor) misc, USE TO CHECK GLUCOSE AND CHANGE EVERY 14 DAYS, Disp: 90 each, Rfl: 3    Insulin Pen Needle (Novofine Pen Needle) 32G X 6 MM misc, Use to inject insulin once daily as directed, Disp: 100 each, Rfl: 5    Insulin Syringe-Needle U-100 32G X 5/16\" 1 ML misc, Use 1 each 3 times a day., Disp: 100 each, Rfl: 2    Lancets (OneTouch Delica Plus [id number]) misc, Use to check blood sugar 3 times daily, Disp: 100 each, Rfl: 5    lisinopril (PRINIVIL,ZESTRIL) 10 MG tablet, Take 1 tablet by mouth Daily., Disp: 90 tablet, Rfl: 2    montelukast (SINGULAIR) 10 MG tablet, Take 1 tablet by mouth Every Night., Disp: 90 tablet, Rfl: 0    Medicines reviewed by Kerri Bolaños, PharmD on 7/10/2024 at 10:43 AM    Drug Interactions  No significant drug-drug interactions with diabetes medications expected according to literature.     Adverse Drug Reactions  Adverse Reactions Experienced: None  Plan for ADR Management: None required    Hospitalizations and Urgent Care Since Last Assessment  Hospitalizations or Admissions: None  ED Visits: None   Urgent Office Visits: None    Adherence and Self-Administration  Adherence related patient's specialty therapy was discussed with the patient. The Adherence segment of this outreach has been reviewed and updated.      Ongoing or New Barriers to Patient Adherence and/or Self-Administration: None  Methods for Supporting Patient Adherence and/or Self-Administration: None required    Goals of Therapy  Goals related to the patient's specialty therapy was discussed with the patient. The Patient Goals segment of this outreach has been reviewed and updated.    Goals Addressed Today        Consistently take medications as prescribed           Reassessment Plan & Follow-Up   Medication Therapy Changes: Per Suly Cisse, APRN will order  Metformin 1,000 mg QD  Mounjaro 7.5 mg weekly  Toujeo 80 units QHS  Additional Plans, Therapy " Recommendations, or Therapy Problems to Be Addressed: Will send updated prescriptions to [facility] for shipping services.   Pharmacist to perform regular reassessments no more than (6) months from the previous assessment.  Care Coordinator to set up future refill outreaches, coordinate prescription delivery, and escalate clinical questions to pharmacist.     Attestation  I attest the patient was actively involved in and has agreed to the above plan of care. If the prescribed therapy is at any point deemed not appropriate based on the current or future assessments, a consultation will be initiated with the patient's specialty care provider to determine the best course of action. The revised plan of therapy will be documented along with any required assessments and/or additional patient education provided.     Kerri Bolaños, PharmD, [facility]  Clinical Specialty Pharmacist, Endocrinology  7/10/2024  10:56 EDT

## 2024-08-02 ENCOUNTER — SPECIALTY PHARMACY (OUTPATIENT)
Dept: PHARMACY | Facility: HOSPITAL | Age: 39
End: 2024-08-02
Payer: COMMERCIAL

## 2024-08-02 NOTE — PROGRESS NOTES
Specialty Pharmacy Refill Coordination Note     Bobby is a 39 y.o. male contacted today regarding refills of  mounjaro, metformin, toujeo specialty medication(s).    Reviewed and verified with patient:       Specialty medication(s) and dose(s) confirmed: yes    Refill Questions      Flowsheet Row Most Recent Value   Changes to allergies? No   Changes to medications? No   New conditions or infections since last clinic visit No   Unplanned office visit, urgent care, ED, or hospital admission in the last 4 weeks  No   How does patient/caregiver feel medication is working? Very good   Financial problems or insurance changes  No   Since the previous refill, were any specialty medication doses or scheduled injections missed or delayed?  No   Why were doses missed? na   Does this patient require a clinical escalation to a pharmacist? No            Delivery Questions      Flowsheet Row Most Recent Value   Copay verified? No   Copay amount na   Copay form of payment No copayment ($0)                   Follow-up: 30 day(s)     Verona Reed, Pharmacy Technician  Specialty Pharmacy Technician

## 2024-08-27 ENCOUNTER — SPECIALTY PHARMACY (OUTPATIENT)
Dept: PHARMACY | Facility: HOSPITAL | Age: 39
End: 2024-08-27
Payer: COMMERCIAL

## 2024-08-27 NOTE — PROGRESS NOTES
Specialty Pharmacy Refill Coordination Note     Bobby is a 39 y.o. male contacted today regarding refills of  Lisinopril, metformin, and toujeo specialty medication(s).    Reviewed and verified with patient:       Specialty medication(s) and dose(s) confirmed: yes    Refill Questions      Flowsheet Row Most Recent Value   Changes to allergies? No   Changes to medications? No   New conditions or infections since last clinic visit No   Unplanned office visit, urgent care, ED, or hospital admission in the last 4 weeks  No   How does patient/caregiver feel medication is working? Very good   Financial problems or insurance changes  No   Since the previous refill, were any specialty medication doses or scheduled injections missed or delayed?  No   Why were doses missed? na   Does this patient require a clinical escalation to a pharmacist? No            Delivery Questions      Flowsheet Row Most Recent Value   Delivery method [employer]   Delivery address verified with patient/caregiver? Yes   Delivery address Home   Number of medications in delivery 5   Medication(s) being filled and delivered Lisinopril (Ace Inhibitors), Tirzepatide, Metformin Hcl (Biguanides), Insulin Glargine, Insulin Pen Needle   Doses left of specialty medications na   Copay verified? No   Copay amount 0   Copay form of payment Pay at pickup   Ship Date 08/28   Delivery Date 08/29   Signature Required No                   Follow-up: 30 day(s)     Verona Reed, Pharmacy Technician  Specialty Pharmacy Technician

## 2024-09-24 ENCOUNTER — SPECIALTY PHARMACY (OUTPATIENT)
Dept: PHARMACY | Facility: HOSPITAL | Age: 39
End: 2024-09-24
Payer: COMMERCIAL

## 2024-10-02 ENCOUNTER — SPECIALTY PHARMACY (OUTPATIENT)
Dept: ENDOCRINOLOGY | Facility: CLINIC | Age: 39
End: 2024-10-02
Payer: COMMERCIAL

## 2024-10-02 ENCOUNTER — OFFICE VISIT (OUTPATIENT)
Dept: ENDOCRINOLOGY | Facility: CLINIC | Age: 39
End: 2024-10-02
Payer: COMMERCIAL

## 2024-10-02 VITALS
DIASTOLIC BLOOD PRESSURE: 88 MMHG | WEIGHT: 236.2 LBS | HEART RATE: 64 BPM | BODY MASS INDEX: 29.52 KG/M2 | SYSTOLIC BLOOD PRESSURE: 122 MMHG

## 2024-10-02 DIAGNOSIS — E11.65 TYPE 2 DIABETES MELLITUS WITH HYPERGLYCEMIA, WITH LONG-TERM CURRENT USE OF INSULIN: Primary | ICD-10-CM

## 2024-10-02 DIAGNOSIS — E11.65 TYPE 2 DIABETES MELLITUS WITH HYPERGLYCEMIA, WITH LONG-TERM CURRENT USE OF INSULIN: ICD-10-CM

## 2024-10-02 DIAGNOSIS — Z79.4 TYPE 2 DIABETES MELLITUS WITH HYPERGLYCEMIA, WITH LONG-TERM CURRENT USE OF INSULIN: Primary | ICD-10-CM

## 2024-10-02 DIAGNOSIS — Z79.4 TYPE 2 DIABETES MELLITUS WITH HYPERGLYCEMIA, WITH LONG-TERM CURRENT USE OF INSULIN: ICD-10-CM

## 2024-10-02 LAB
ALBUMIN UR-MCNC: 1.3 MG/DL
CREAT UR-MCNC: 193.1 MG/DL
EXPIRATION DATE: ABNORMAL
HBA1C MFR BLD: 8.7 % (ref 4.5–5.7)
Lab: ABNORMAL
MICROALBUMIN/CREAT UR: 6.7 MG/G (ref 0–29)

## 2024-10-02 PROCEDURE — 95251 CONT GLUC MNTR ANALYSIS I&R: CPT

## 2024-10-02 PROCEDURE — 99214 OFFICE O/P EST MOD 30 MIN: CPT

## 2024-10-02 PROCEDURE — 82570 ASSAY OF URINE CREATININE: CPT

## 2024-10-02 PROCEDURE — 82043 UR ALBUMIN QUANTITATIVE: CPT

## 2024-10-02 PROCEDURE — 83036 HEMOGLOBIN GLYCOSYLATED A1C: CPT

## 2024-10-02 RX ORDER — INSULIN GLARGINE 300 U/ML
INJECTION, SOLUTION SUBCUTANEOUS
Qty: 9 ML | Refills: 5 | Status: SHIPPED | OUTPATIENT
Start: 2024-10-02

## 2024-10-02 RX ORDER — BLOOD-GLUCOSE SENSOR
1 EACH MISCELLANEOUS
Qty: 30 EACH | Refills: 5 | Status: SHIPPED | OUTPATIENT
Start: 2024-10-02 | End: 2024-10-02 | Stop reason: SDUPTHER

## 2024-10-02 RX ORDER — METFORMIN HCL 500 MG
1000 TABLET, EXTENDED RELEASE 24 HR ORAL
Qty: 60 TABLET | Refills: 5 | Status: SHIPPED | OUTPATIENT
Start: 2024-10-02

## 2024-10-02 RX ORDER — BLOOD-GLUCOSE SENSOR
1 EACH MISCELLANEOUS
Qty: 2 EACH | Refills: 5 | Status: SHIPPED | OUTPATIENT
Start: 2024-10-02

## 2024-10-02 NOTE — PROGRESS NOTES
Chief Complaint   Patient presents with    Follow-up        Bobby Medina is a 39 y.o. male had concerns including Follow-up.    T2DM.  Diabetes was diagnosed around age 13-14. He was tested for type 1, he is type 2. He checks his blood sugars with Lilli 3 CGM. He is currently taking metformin 1,000mg XR daily, Mounjaro 10mg weekly, and Toujeo 40 units in the morning. He reports rare hypoglycemic episodes. He does report some dizziness due to being on blood pressure medication with normal Blood pressures. He is adjusting to this. BP today 122/80. He works third shift so his sleep schedule is inconsistent as he switches back and forth on the days he is not working. Xigduo was previously stopped due to dizziness. He now thinks this may have been his blood pressure medication and may want to try the medicine again in the future.     Diabetes:.      Eye exam is due  Foot exam today  JUHI today  Statin: no. He did not tolerate due to antony fog. He takes fish oil instead and prefers this.   Stopped xigduo due to dizziness    The following portions of the patient's history were reviewed and updated as appropriate: allergies, current medications, past family history, past medical history, past social history, past surgical history and problem list.    Review of Systems   Constitutional:  Positive for appetite change.   Gastrointestinal:  Negative for abdominal pain, constipation and diarrhea.   Endocrine: Negative for polydipsia, polyphagia and polyuria.   All other systems reviewed and are negative.       Physical Exam  Vitals reviewed.   Constitutional:       Appearance: Normal appearance.   HENT:      Head: Normocephalic.   Eyes:      Extraocular Movements: Extraocular movements intact.      Pupils: Pupils are equal, round, and reactive to light.   Cardiovascular:      Rate and Rhythm: Normal rate.      Pulses: Normal pulses.           Dorsalis pedis pulses are 2+ on the right side and 2+ on the left side.        Posterior  tibial pulses are 2+ on the right side and 2+ on the left side.   Pulmonary:      Effort: Pulmonary effort is normal.   Abdominal:      Palpations: Abdomen is soft.   Musculoskeletal:      Cervical back: Normal range of motion.        Feet:    Feet:      Right foot:      Protective Sensation: 4 sites tested.  4 sites sensed.      Skin integrity: Skin integrity normal.      Left foot:      Protective Sensation: 4 sites tested.  4 sites sensed.      Skin integrity: Skin integrity normal.      Comments: Diabetic Foot Exam Performed and Monofilament Test Performed     Skin:     General: Skin is warm and dry.   Neurological:      Mental Status: He is alert and oriented to person, place, and time.   Psychiatric:         Mood and Affect: Mood normal.         Behavior: Behavior normal.         Thought Content: Thought content normal.         Judgment: Judgment normal.        /88 (BP Location: Right arm, Patient Position: Sitting, Cuff Size: Adult)   Pulse 64   Wt 107 kg (236 lb 3.2 oz)   BMI 29.52 kg/m²      Labs and imaging    CMP:  Lab Results   Component Value Date    BUN 11 11/02/2021    CREATININE 0.65 (L) 11/02/2021    EGFRIFNONA 139 11/02/2021    BCR 16.9 11/02/2021     11/02/2021    K 4.2 11/02/2021    CO2 26.4 11/02/2021    CALCIUM 9.6 11/02/2021    ALBUMIN 4.50 11/02/2021    BILITOT 0.5 11/02/2021    ALKPHOS 60 11/02/2021    AST 24 11/02/2021    ALT 46 (H) 11/02/2021     Lipid Panel:  Lab Results   Component Value Date    CHOL 205 (H) 11/02/2021    TRIG 148 11/02/2021    HDL 39 (L) 11/02/2021    VLDL 27 11/02/2021     (H) 11/02/2021     HbA1c:  Lab Results   Component Value Date    HGBA1C 8.7 (A) 10/02/2024    HGBA1C 8.7 (A) 03/27/2024     Glucose:    Lab Results   Component Value Date    POCGLU 168 (A) 09/27/2023     Microalbumin:  Lab Results   Component Value Date    MALBCRERATIO  06/21/2023      Comment:      Unable to calculate     TSH:  Lab Results   Component Value Date    TSH 1.240  11/02/2021       Assessment and plan  Diagnoses and all orders for this visit:    1. Type 2 diabetes mellitus with hyperglycemia, with long-term current use of insulin (Primary)  Assessment & Plan:  -Diabetes is unchanged, not at goal with A1c 8.7.   -CGM data reviewed from the last two weeks shows average glucose 212 with variability of 23.8%. In target range 28%, high 50%, very high 22%, low 0%, very low 0%.   Pattern of: hyperglycemia. Fasting blood sugars elevated.   -Increase Toujeo from 40 units daily to 50 units daily. Max dose 70 units daily. Will check fasting blood sugars and titrate up by 2 units every 3 days for goal of fasting blood sugars less than 150. Patient will check in with my on mychart before next appointment and let me know how his fastings are running.   -Increase Mounjaro from 10mg to 12mg weekly. He has been tolerating 10 mg dose. Denies GI side effects. Patient has no personal history of pancreatitis, no family history of MEN syndrome or medullary thyroid cancer. Possible side effects including nausea, bloating, other GI upset and rarely pancreatitis were discussed. He was advised to call the office with any symptoms or concerns.   -Continue metformin 1,000mg XR daily  -JUHI today  -Fasting lipid panel next visit  -Discussed dietary and exercise guidelines with patient and family.  -Discussed the importance of yearly eye exams.  -Discussed Glucagon use and appropriate timing for this.   -S/S hypoglycemia reviewed with Rule of 15's advised.  -Discussed long term risks of untreated/undertreated diabetes including retinopathy, neuropathy, nephropathy, amputations, hospitalizations, MI, CVA.      Orders:  -     POC Glycosylated Hemoglobin (Hb A1C)  -     Microalbumin / Creatinine Urine Ratio - Urine, Clean Catch         Return in about 6 months (around 4/2/2025). The patient was instructed to contact the clinic with any interval questions or concerns.    This document has been electronically  signed by Bridgett Manning, APRN  October 2, 2024 09:55 EDT  Endocrinology

## 2024-10-02 NOTE — PROGRESS NOTES
Specialty Pharmacy Patient Management Program  Endocrinology Reassessment     Bobyb Medina is a 39 y.o. male with Type 2 Diabetes enrolled in the Endocrinology Patient Management program offered by [facility]. An assessment of continued therapy appropriateness, medication adherence, and side effect incidence and management was conducted for Mounjaro, Metformin and insulin therapy.     Patient is currently taking metformin 1,000 mg daily, Toujeo 40 units daily, and Mounjaro 10 mg weekly to control BG. He denies any issues with current therapy, denies any missed doses, and denies any episodes of hypoglycemia. He uses FreeStyle Lilli 3 to monitor BG with readings in the 200s.     Patient denies personal/family history of thyroid cancer, denies history of pancreatitis, and denies issues with recurrent UTI/yeast infections.     In the past, the patient has tried:     Drug Dose Reason for Discontinuation Notes   Soliqua  Medication change                  Changes to Insurance Coverage or Financial Support  None    Relevant Past Medical History and Comorbidities  Relevant medical history and concomitant health conditions were discussed with the patient. The patient's chart has been reviewed for relevant past medical history and comorbid health conditions and updated as necessary.   Past Medical History:   Diagnosis Date    Hypercholesterolemia     Hypoglycemia     Well controlled type 2 diabetes mellitus      Social History     Socioeconomic History    Marital status:    Tobacco Use    Smoking status: Never    Smokeless tobacco: Never   Vaping Use    Vaping status: Never Used   Substance and Sexual Activity    Alcohol use: Not Currently    Drug use: Never    Sexual activity: Defer       Problem list reviewed by Kerri Bolaños, PharmD on 10/2/2024 at  8:54 AM    Allergies  Known allergies and reactions were discussed with the patient. The patient's chart has been reviewed for allergy  information and updated as necessary.   Patient has no known allergies.    Allergies reviewed by Kerri Bolaños, PharmD on 10/2/2024 at  8:54 AM    Relevant Laboratory Values  A1C Last 3 Results          3/27/2024    11:08 10/2/2024    08:56   HGBA1C Last 3 Results   Hemoglobin A1C 8.7  8.7      Lab Results   Component Value Date    HGBA1C 8.7 (A) 10/02/2024     Lab Results   Component Value Date    GLUCOSE 139 (H) 11/02/2021    CALCIUM 9.6 11/02/2021     11/02/2021    K 4.2 11/02/2021    CO2 26.4 11/02/2021     11/02/2021    BUN 11 11/02/2021    CREATININE 0.65 (L) 11/02/2021    EGFRIFNONA 139 11/02/2021    BCR 16.9 11/02/2021    ANIONGAP 9.6 11/02/2021     Lab Results   Component Value Date    CHOL 205 (H) 11/02/2021    TRIG 148 11/02/2021    HDL 39 (L) 11/02/2021     (H) 11/02/2021     Current Medication List  This medication list has been reviewed with the patient and evaluated for any interactions or necessary modifications/recommendations, and updated to include all prescription medications, OTC medications, and supplements the patient is currently taking.  This list reflects what is contained in the patient's profile, which has also been marked as reviewed to communicate to other providers it is the most up to date version of the patient's current medication therapy.     Current Outpatient Medications:     Blood Glucose Monitoring Suppl (ONE TOUCH ULTRA 2) w/Device kit, Use to check blood sugar 3 times daily, Disp: 1 each, Rfl: 0    Continuous Glucose Sensor (FreeStyle Lilli 3 Sensor) [facility], Change sensor every 14 days, Disp: 2 each, Rfl: 5    Insulin Glargine, 1 Unit Dial, (Toujeo SoloStar) 300 UNIT/ML solution pen-injector injection, Inject 80 Units under the skin into the appropriate area as directed Every Night. (Patient taking differently: Inject 40 Units under the skin into the appropriate area as directed Every Night.), Disp: 18 mL, Rfl: 2    Insulin Pen Needle (Novofine Pen Needle)  "32G X 6 MM misc, Use to inject insulin once daily as directed, Disp: 100 each, Rfl: 5    Insulin Syringe-Needle U-100 32G X 5/16\" 1 ML misc, Use 1 each 3 times a day., Disp: 100 each, Rfl: 2    Lancets (OneTouch Delica Plus [id number]) misc, Use to check blood sugar 3 times daily, Disp: 100 each, Rfl: 5    lisinopril (PRINIVIL,ZESTRIL) 10 MG tablet, Take 1 tablet by mouth Daily., Disp: 90 tablet, Rfl: 2    metFORMIN ER (GLUCOPHAGE-XR) 500 MG 24 hr tablet, Take 2 tablets by mouth Daily With Breakfast., Disp: 60 tablet, Rfl: 5    montelukast (SINGULAIR) 10 MG tablet, Take 1 tablet by mouth Every Night., Disp: 90 tablet, Rfl: 0    Tirzepatide (Mounjaro) 10 MG/0.5ML solution pen-injector pen, Inject 0.5 mL under the skin into the appropriate area as directed 1 (One) Time Per Week., Disp: 2 mL, Rfl: 5    Medicines reviewed by Kerri Bolaños, PharmD on 10/2/2024 at  8:55 AM    Drug Interactions  No significant drug-drug interactions with diabetes medications expected according to literature.     Adverse Drug Reactions  Adverse Reactions Experienced: None  Plan for ADR Management: None required    Hospitalizations and Urgent Care Since Last Assessment  Hospitalizations or Admissions: None  ED Visits: None   Urgent Office Visits: None    Adherence and Self-Administration  Adherence related patient's specialty therapy was discussed with the patient. The Adherence segment of this outreach has been reviewed and updated.      Ongoing or New Barriers to Patient Adherence and/or Self-Administration: None  Methods for Supporting Patient Adherence and/or Self-Administration: None required    Goals of Therapy  Goals related to the patient's specialty therapy was discussed with the patient. The Patient Goals segment of this outreach has been reviewed and updated.    Goals Addressed Today        Consistently take medications as prescribed      HEMOGLOBIN A1C < 7              Specialty Pharmacy General Goal      Prevent hypoglycemia   "          Reassessment Plan & Follow-Up   Patient's diabetes remains unchanged with A1C of 8.7%  Medication Therapy Changes: Per Bridgett Manning, APRN will order  Metformin 1,000 mg QD  Mounjaro 12.5 mg weekly  Toujeo 50 units QHS, titrating up every 3 days until fasting BG is <150. MDD 70 units  Additional Plans, Therapy Recommendations, or Therapy Problems to Be Addressed: Will send updated prescriptions to [facility] for shipping services.   Pharmacist to perform regular reassessments no more than (6) months from the previous assessment.  Care Coordinator to set up future refill outreaches, coordinate prescription delivery, and escalate clinical questions to pharmacist.     Attestation  I attest the patient was actively involved in and has agreed to the above plan of care. If the prescribed therapy is at any point deemed not appropriate based on the current or future assessments, a consultation will be initiated with the patient's specialty care provider to determine the best course of action. The revised plan of therapy will be documented along with any required assessments and/or additional patient education provided.     Kerri Bolaños, PharmD, [facility]  Clinical Specialty Pharmacist, Endocrinology  10/2/2024  09:08 EDT

## 2024-10-02 NOTE — ASSESSMENT & PLAN NOTE
-Diabetes is unchanged, not at goal with A1c 8.7.   -CGM data reviewed from the last two weeks shows average glucose 212 with variability of 23.8%. In target range 28%, high 50%, very high 22%, low 0%, very low 0%.   Pattern of: hyperglycemia. Fasting blood sugars elevated.   -Increase Toujeo from 40 units daily to 50 units daily. Max dose 70 units daily. Will check fasting blood sugars and titrate up by 2 units every 3 days for goal of fasting blood sugars less than 150. Patient will check in with my on mychart before next appointment and let me know how his fastings are running.   -Increase Mounjaro from 10mg to 12mg weekly. He has been tolerating 10 mg dose. Denies GI side effects. Patient has no personal history of pancreatitis, no family history of MEN syndrome or medullary thyroid cancer. Possible side effects including nausea, bloating, other GI upset and rarely pancreatitis were discussed. He was advised to call the office with any symptoms or concerns.   -Continue metformin 1,000mg XR daily  -JUHI today  -Fasting lipid panel next visit  -Discussed dietary and exercise guidelines with patient and family.  -Discussed the importance of yearly eye exams.  -Discussed Glucagon use and appropriate timing for this.   -S/S hypoglycemia reviewed with Rule of 15's advised.  -Discussed long term risks of untreated/undertreated diabetes including retinopathy, neuropathy, nephropathy, amputations, hospitalizations, MI, CVA.

## 2024-10-02 NOTE — PATIENT INSTRUCTIONS
10/02/24    Bobby Medina 1985     Your A1C is:   Lab Results   Component Value Date    HGBA1C 8.7 (A) 10/02/2024    HGBA1C 8.7 (A) 03/27/2024    HGBA1C 7.4 09/27/2023       -Increase Mounjaro to 12.5mg once weekly    ADA General Goals: A1c: < 7%                                                  Fasting/before meal glucose: <150 mg/dL                                    2 Hour after meal glucoses: < 180 mg/dL                                        Bedtime glucose:120-180              Toujeo is long acting insulin that lasts 24 hours. It is taken at bedtime every night and should not be skipped unless instructed by your doctor         Starting Basal Insulin Dose: 50 units before bedtime         Change insulin doses every 3 days based on the following:    If before breakfast blood sugar readings are consistently higher than 150 for 3-4 days, raise insulin dose by 2 units. Max dose of 70 units daily.     If after 2 weeks, before meal blood sugars are not lower than 150, call the office.    If you are having frequent blood sugars lower than 70, call the office.    PEDRO Adams

## 2024-10-24 ENCOUNTER — SPECIALTY PHARMACY (OUTPATIENT)
Dept: PHARMACY | Facility: HOSPITAL | Age: 39
End: 2024-10-24
Payer: COMMERCIAL

## 2024-11-12 ENCOUNTER — SPECIALTY PHARMACY (OUTPATIENT)
Dept: PHARMACY | Facility: HOSPITAL | Age: 39
End: 2024-11-12
Payer: COMMERCIAL

## 2024-11-12 NOTE — PROGRESS NOTES
Specialty Pharmacy Refill Coordination Note     Bobby is a 39 y.o. male contacted today regarding refills of  BD Pen needles, toujeo, freestyle sensor, lisinopril, metformin, mounjaro specialty medication(s).    Reviewed and verified with patient:       Specialty medication(s) and dose(s) confirmed: yes    Refill Questions      Flowsheet Row Most Recent Value   Changes to allergies? No   Changes to medications? No   New conditions or infections since last clinic visit No   Unplanned office visit, urgent care, ED, or hospital admission in the last 4 weeks  No   How does patient/caregiver feel medication is working? Very good   Financial problems or insurance changes  No   Since the previous refill, were any specialty medication doses or scheduled injections missed or delayed?  No   Why were doses missed? na   Does this patient require a clinical escalation to a pharmacist? No            Delivery Questions      Flowsheet Row Most Recent Value   Delivery method UPS   Delivery address verified with patient/caregiver? Yes   Delivery address Home   Number of medications in delivery 6   Medication(s) being filled and delivered Insulin Pen Needle (Novofine Pen Needle), Lisinopril (PRINIVIL,ZESTRIL), Continuous Glucose Sensor (FreeStyle Lilli 3 Sensor), Insulin Glargine (Toujeo SoloStar), metFORMIN HCl (GLUCOPHAGE-XR), Tirzepatide (MOUNJARO)   Doses left of specialty medications na   Copay verified? No   Copay amount 0$   Copay form of payment No copayment ($0)   Ship Date 11/13   Delivery Date 11/14   Signature Required No                   Follow-up: 30 day(s)     Verona Reed, Pharmacy Technician  Specialty Pharmacy Technician

## 2024-12-13 ENCOUNTER — SPECIALTY PHARMACY (OUTPATIENT)
Dept: PHARMACY | Facility: HOSPITAL | Age: 39
End: 2024-12-13
Payer: COMMERCIAL

## 2024-12-13 NOTE — PROGRESS NOTES
Specialty Pharmacy Refill Coordination Note     Bobby is a 39 y.o. male contacted today regarding refills of  Freestyle sensor, Metformin, Toujeo specialty medication(s).    Reviewed and verified with patient:       Specialty medication(s) and dose(s) confirmed: yes    Refill Questions      Flowsheet Row Most Recent Value   Changes to allergies? No   Changes to medications? No   New conditions or infections since last clinic visit No   Unplanned office visit, urgent care, ED, or hospital admission in the last 4 weeks  No   How does patient/caregiver feel medication is working? Very good   Financial problems or insurance changes  No   If yes, describe changes in insurance or financial issues. na   Since the previous refill, were any specialty medication doses or scheduled injections missed or delayed?  No   If yes, please provide the amount na   Why were doses missed? na   Does this patient require a clinical escalation to a pharmacist? No            Delivery Questions      Flowsheet Row Most Recent Value   Delivery method UPS   Delivery address verified with patient/caregiver? Yes   Delivery address Home   Number of medications in delivery 5   Medication(s) being filled and delivered Insulin Pen Needle (Novofine Pen Needle), Continuous Glucose Sensor (FreeStyle Lilli 3 Sensor), metFORMIN HCl (GLUCOPHAGE-XR), Tirzepatide (MOUNJARO), Insulin Glargine (Toujeo SoloStar), Lisinopril (PRINIVIL,ZESTRIL)   Doses left of specialty medications na   Copay verified? No   Copay amount 0$   Copay form of payment No copayment ($0)   Ship Date 12/16   Delivery Date 12/17   Signature Required No                   Follow-up: 30 day(s)     Verona Reed, Pharmacy Technician  Specialty Pharmacy Technician

## 2024-12-18 ENCOUNTER — SPECIALTY PHARMACY (OUTPATIENT)
Dept: PHARMACY | Facility: HOSPITAL | Age: 39
End: 2024-12-18
Payer: COMMERCIAL

## 2024-12-18 DIAGNOSIS — E11.65 TYPE 2 DIABETES MELLITUS WITH HYPERGLYCEMIA, WITH LONG-TERM CURRENT USE OF INSULIN: Primary | ICD-10-CM

## 2024-12-18 DIAGNOSIS — Z79.4 TYPE 2 DIABETES MELLITUS WITH HYPERGLYCEMIA, WITH LONG-TERM CURRENT USE OF INSULIN: Primary | ICD-10-CM

## 2024-12-18 RX ORDER — TIRZEPATIDE 10 MG/.5ML
10 INJECTION, SOLUTION SUBCUTANEOUS WEEKLY
Qty: 2 ML | Refills: 5 | Status: SHIPPED | OUTPATIENT
Start: 2024-12-18

## 2024-12-18 NOTE — PROGRESS NOTES
Specialty Pharmacy Patient Management Program  Endocrinology Reassessment     Bobby Medina is a 39 y.o. male with Type 2 Diabetes enrolled in the Endocrinology Patient Management program offered by [facility]. An assessment of continued therapy appropriateness, medication adherence, and side effect incidence and management was conducted for Mounjaro, Metformin and insulin therapy.     Patient is currently taking Mounjaro 12.5 mg weekly and reports some nausea and vomiting with increased dose. He would like to decrease to Mounjaro 10 mg dose as he tolerated better in the past.     Patient denies personal/family history of thyroid cancer, denies history of pancreatitis, and denies issues with recurrent UTI/yeast infections.     In the past, the patient has tried:     Drug Dose Reason for Discontinuation Notes   Soliqua  Medication change                  Changes to Insurance Coverage or Financial Support  None    Relevant Past Medical History and Comorbidities  Relevant medical history and concomitant health conditions were discussed with the patient. The patient's chart has been reviewed for relevant past medical history and comorbid health conditions and updated as necessary.   Past Medical History:   Diagnosis Date    Hypercholesterolemia     Hypoglycemia     Well controlled type 2 diabetes mellitus      Social History     Socioeconomic History    Marital status:    Tobacco Use    Smoking status: Never    Smokeless tobacco: Never   Vaping Use    Vaping status: Never Used   Substance and Sexual Activity    Alcohol use: Not Currently    Drug use: Never    Sexual activity: Defer       Problem list reviewed by Kerri Bolaños, Noemy on 12/18/2024 at  4:15 PM    Allergies  Known allergies and reactions were discussed with the patient. The patient's chart has been reviewed for allergy information and updated as necessary.   Patient has no known allergies.    Allergies reviewed by Miky  "Noemy Manning on 12/18/2024 at  4:15 PM    Relevant Laboratory Values  A1C Last 3 Results          3/27/2024    11:08 10/2/2024    08:56   HGBA1C Last 3 Results   Hemoglobin A1C 8.7  8.7      Lab Results   Component Value Date    HGBA1C 8.7 (A) 10/02/2024     Lab Results   Component Value Date    GLUCOSE 139 (H) 11/02/2021    CALCIUM 9.6 11/02/2021     11/02/2021    K 4.2 11/02/2021    CO2 26.4 11/02/2021     11/02/2021    BUN 11 11/02/2021    CREATININE 0.65 (L) 11/02/2021    EGFRIFNONA 139 11/02/2021    BCR 16.9 11/02/2021    ANIONGAP 9.6 11/02/2021     Lab Results   Component Value Date    CHOL 205 (H) 11/02/2021    TRIG 148 11/02/2021    HDL 39 (L) 11/02/2021     (H) 11/02/2021     Microalbumin          10/2/2024    09:37   Microalbumin   Microalbumin, Urine 1.3    Current Medication List  This medication list has been reviewed with the patient and evaluated for any interactions or necessary modifications/recommendations, and updated to include all prescription medications, OTC medications, and supplements the patient is currently taking.  This list reflects what is contained in the patient's profile, which has also been marked as reviewed to communicate to other providers it is the most up to date version of the patient's current medication therapy.     Current Outpatient Medications:     Blood Glucose Monitoring Suppl (ONE TOUCH ULTRA 2) w/Device kit, Use to check blood sugar 3 times daily, Disp: 1 each, Rfl: 0    Continuous Glucose Sensor (FreeStyle Lilli 3 Sensor) misc, Change sensor every 14 days, Disp: 2 each, Rfl: 5    Insulin Glargine, 1 Unit Dial, (Toujeo SoloStar) 300 UNIT/ML solution pen-injector injection, Inject up to 70 units nightly as directed, Disp: 9 mL, Rfl: 5    Insulin Pen Needle (Novofine Pen Needle) 32G X 6 MM misc, Use to inject insulin once daily as directed, Disp: 100 each, Rfl: 5    Insulin Syringe-Needle U-100 32G X 5/16\" 1 ML misc, Use 1 each 3 times a day., Disp: " 100 each, Rfl: 2    Lancets (OneTouch Delica Plus [id number]) misc, Use to check blood sugar 3 times daily, Disp: 100 each, Rfl: 5    lisinopril (PRINIVIL,ZESTRIL) 10 MG tablet, Take 1 tablet by mouth Daily., Disp: 90 tablet, Rfl: 2    metFORMIN ER (GLUCOPHAGE-XR) 500 MG 24 hr tablet, Take 2 tablets by mouth Daily With Breakfast., Disp: 60 tablet, Rfl: 5    montelukast (SINGULAIR) 10 MG tablet, Take 1 tablet by mouth Every Night., Disp: 90 tablet, Rfl: 0    Tirzepatide (Mounjaro) 10 MG/0.5ML solution auto-injector, Inject 10 mg under the skin into the appropriate area as directed 1 (One) Time Per Week., Disp: 2 mL, Rfl: 5    Medicines reviewed by Kerri Bolaños, PharmD on 12/18/2024 at  4:15 PM    Drug Interactions  No significant drug-drug interactions with diabetes medications expected according to literature.     Adverse Drug Reactions  Adverse Reactions Experienced: Nausea and vomiting with increased dose of Mounjaro  Plan for ADR Management: Pharmacist to consult provider.     Hospitalizations and Urgent Care Since Last Assessment  Hospitalizations or Admissions: None  ED Visits: None   Urgent Office Visits: None    Adherence and Self-Administration  Adherence related patient's specialty therapy was discussed with the patient. The Adherence segment of this outreach has been reviewed and updated.      Ongoing or New Barriers to Patient Adherence and/or Self-Administration: None  Methods for Supporting Patient Adherence and/or Self-Administration: None required    Goals of Therapy  Goals related to the patient's specialty therapy was discussed with the patient. The Patient Goals segment of this outreach has been reviewed and updated.    Goals Addressed Today        HEMOGLOBIN A1C < 7                    Reassessment Plan & Follow-Up   Medication Therapy Changes: Per PEDRO Hiknle will order  Decrease to Mounjaro 10 mg weekly due to nausea and vomiting.   Additional Plans, Therapy Recommendations, or Therapy  Problems to Be Addressed: Will send updated prescriptions to [facility] for shipping services.   Pharmacist to perform regular reassessments no more than (6) months from the previous assessment.  Care Coordinator to set up future refill outreaches, coordinate prescription delivery, and escalate clinical questions to pharmacist.     Attestation  I attest the patient was actively involved in and has agreed to the above plan of care. If the prescribed therapy is at any point deemed not appropriate based on the current or future assessments, a consultation will be initiated with the patient's specialty care provider to determine the best course of action. The revised plan of therapy will be documented along with any required assessments and/or additional patient education provided.     Kerri Bolaños, PharmD, [facility]  Clinical Specialty Pharmacist, Endocrinology  12/18/2024  16:16 EST

## 2025-01-13 ENCOUNTER — SPECIALTY PHARMACY (OUTPATIENT)
Dept: PHARMACY | Facility: HOSPITAL | Age: 40
End: 2025-01-13
Payer: COMMERCIAL

## 2025-01-13 NOTE — PROGRESS NOTES
Specialty Pharmacy Refill Coordination Note     Bobby is a 39 y.o. male contacted today regarding refills of  Freestyle Lilli, Metformin, Toujeo specialty medication(s).    Reviewed and verified with patient:       Specialty medication(s) and dose(s) confirmed: yes    Refill Questions      Flowsheet Row Most Recent Value   Changes to allergies? No   Changes to medications? No   New conditions or infections since last clinic visit No   Unplanned office visit, urgent care, ED, or hospital admission in the last 4 weeks  No   How does patient/caregiver feel medication is working? Very good   Financial problems or insurance changes  No   If yes, describe changes in insurance or financial issues. na   Since the previous refill, were any specialty medication doses or scheduled injections missed or delayed?  No   If yes, please provide the amount na   Why were doses missed? na   Does this patient require a clinical escalation to a pharmacist? No            Delivery Questions      Flowsheet Row Most Recent Value   Delivery method UPS   Delivery address verified with patient/caregiver? Yes   Delivery address Home   Number of medications in delivery 3   Medication(s) being filled and delivered Continuous Glucose Sensor (FreeStyle Lilli 3 Sensor), Insulin Glargine (Toujeo SoloStar), metFORMIN HCl (GLUCOPHAGE-XR)   Doses left of specialty medications na   Copay verified? Yes   Copay amount 113.33   Copay form of payment Credit/debit on file   Ship Date 01/14   Delivery Date 01/15   Signature Required No                   Follow-up: 30 day(s)     Verona Reed, Pharmacy Technician  Specialty Pharmacy Technician

## 2025-01-16 NOTE — PROGRESS NOTES
Patient is going to discontinue Mounjaro due to cost. Per Suly he is to keep checking his blood sugars and we will get him scheduled in ~2 weeks to see how they are running and she will make any adjustments at that time. Patient aware

## 2025-02-06 ENCOUNTER — SPECIALTY PHARMACY (OUTPATIENT)
Dept: PHARMACY | Facility: HOSPITAL | Age: 40
End: 2025-02-06
Payer: COMMERCIAL

## 2025-02-06 NOTE — PROGRESS NOTES
Pessary    Date/Time: 9/11/2024 9:00 AM    Performed by: Adele Neal CNM  Authorized by: Adele Neal CNM  [place] Protocol:  Consent: Verbal consent obtained.  Risks and benefits: risks, benefits and alternatives were discussed  Consent given by: patient  Patient understanding: patient states understanding of the procedure being performed  Patient consent: the patient's understanding of the procedure matches consent given  Procedure consent: procedure consent matches procedure scheduled  Relevant documents: relevant documents present and verified  Test results: test results available and properly labeled  Required items: required blood products, implants, devices, and special equipment available  Patient identity confirmed: verbally with patient    Indication:     Indication for pessary: cystocele    Pre-procedure:     Pessary procedure type:  Cleaning/check  Problems:     Pessary complications: none    Procedure:     Pessary type:  Gellhorn flexible    Pessary size:  4    Patient tolerance of procedure:  Tolerated well, no immediate complications  Comments:     Procedure comments:  Spec exam- no lesions, excoriations, abnormal discharge       Specialty Pharmacy Refill Coordination Note     Bobby is a 39 y.o. male contacted today regarding refills of  Metformin, Freestyle sensors, Toujeo specialty medication(s).    Reviewed and verified with patient:       Specialty medication(s) and dose(s) confirmed: yes    Refill Questions      Flowsheet Row Most Recent Value   Changes to allergies? No   Changes to medications? No   New conditions or infections since last clinic visit No   Unplanned office visit, urgent care, ED, or hospital admission in the last 4 weeks  No   How does patient/caregiver feel medication is working? Very good   Financial problems or insurance changes  No   If yes, describe changes in insurance or financial issues. na   Since the previous refill, were any specialty medication doses or scheduled injections missed or delayed?  No   If yes, please provide the amount na   Why were doses missed? na   Does this patient require a clinical escalation to a pharmacist? No            Delivery Questions      Flowsheet Row Most Recent Value   Delivery method UPS   Delivery address verified with patient/caregiver? Yes   Delivery address Home   Number of medications in delivery 3   Medication(s) being filled and delivered Continuous Glucose Sensor (FreeStyle Lilli 3 Sensor), Insulin Glargine (Toujeo SoloStar), metFORMIN HCl (GLUCOPHAGE-XR)   Doses left of specialty medications na   Copay verified? Yes   Copay amount 122.76$   Copay form of payment Credit/debit on file   Ship Date 02/10   Delivery Date Selection 02/11/25   Signature Required No                   Follow-up: 30 day(s)     Verona Reed, Pharmacy Technician  Specialty Pharmacy Technician

## 2025-02-14 RX ORDER — LISINOPRIL 10 MG/1
10 TABLET ORAL DAILY
Qty: 90 TABLET | Refills: 2 | Status: SHIPPED | OUTPATIENT
Start: 2025-02-14

## 2025-02-26 ENCOUNTER — OFFICE VISIT (OUTPATIENT)
Dept: ENDOCRINOLOGY | Facility: CLINIC | Age: 40
End: 2025-02-26
Payer: COMMERCIAL

## 2025-02-26 ENCOUNTER — SPECIALTY PHARMACY (OUTPATIENT)
Dept: PHARMACY | Facility: HOSPITAL | Age: 40
End: 2025-02-26
Payer: COMMERCIAL

## 2025-02-26 VITALS
BODY MASS INDEX: 31.77 KG/M2 | DIASTOLIC BLOOD PRESSURE: 90 MMHG | OXYGEN SATURATION: 98 % | HEART RATE: 76 BPM | SYSTOLIC BLOOD PRESSURE: 150 MMHG | WEIGHT: 254.2 LBS

## 2025-02-26 DIAGNOSIS — Z79.4 TYPE 2 DIABETES MELLITUS WITH HYPERGLYCEMIA, WITH LONG-TERM CURRENT USE OF INSULIN: Primary | ICD-10-CM

## 2025-02-26 DIAGNOSIS — E11.65 TYPE 2 DIABETES MELLITUS WITH HYPERGLYCEMIA, WITH LONG-TERM CURRENT USE OF INSULIN: Primary | ICD-10-CM

## 2025-02-26 LAB
EXPIRATION DATE: ABNORMAL
HBA1C MFR BLD: 7.8 % (ref 4.5–5.7)
Lab: ABNORMAL

## 2025-02-26 PROCEDURE — 82570 ASSAY OF URINE CREATININE: CPT

## 2025-02-26 PROCEDURE — 84443 ASSAY THYROID STIM HORMONE: CPT

## 2025-02-26 PROCEDURE — 82043 UR ALBUMIN QUANTITATIVE: CPT

## 2025-02-26 PROCEDURE — 80053 COMPREHEN METABOLIC PANEL: CPT

## 2025-02-26 PROCEDURE — 80061 LIPID PANEL: CPT

## 2025-02-26 RX ORDER — INSULIN GLARGINE 300 U/ML
INJECTION, SOLUTION SUBCUTANEOUS
Qty: 9 ML | Refills: 5 | Status: SHIPPED | OUTPATIENT
Start: 2025-02-26

## 2025-02-26 RX ORDER — ACYCLOVIR 800 MG/1
1 TABLET ORAL
Qty: 2 EACH | Refills: 5 | Status: SHIPPED | OUTPATIENT
Start: 2025-02-26

## 2025-02-26 RX ORDER — METFORMIN HYDROCHLORIDE 500 MG/1
1000 TABLET, EXTENDED RELEASE ORAL
Qty: 60 TABLET | Refills: 5 | Status: SHIPPED | OUTPATIENT
Start: 2025-02-26 | End: 2025-02-27 | Stop reason: SDUPTHER

## 2025-02-26 NOTE — PROGRESS NOTES
Specialty Pharmacy Patient Management Program  Refill Outreach     Bobby was contacted today regarding refills of their medication(s).    Refill Questions      Flowsheet Row Most Recent Value   Changes to allergies? No   Changes to medications? No   New conditions or infections since last clinic visit No   Unplanned office visit, urgent care, ED, or hospital admission in the last 4 weeks  No   How does patient/caregiver feel medication is working? Very good   Financial problems or insurance changes  No   If yes, describe changes in insurance or financial issues. na   Since the previous refill, were any specialty medication doses or scheduled injections missed or delayed?  No   If yes, please provide the amount na   Why were doses missed? na   Does this patient require a clinical escalation to a pharmacist? No            Delivery Questions      Flowsheet Row Most Recent Value   Delivery method UPS   Delivery address verified with patient/caregiver? Yes   Delivery address Home   Number of medications in delivery 1   Medication(s) being filled and delivered Tirzepatide (Mounjaro)   Doses left of specialty medications na   Copay verified? Yes   Copay amount 35.00$   Copay form of payment Credit/debit on file   Delivery Date Selection 02/28/25   Signature Required No                 Follow-up: 30 day(s)     Verona Reed, Pharmacy Technician  2/26/2025  14:20 EST

## 2025-02-26 NOTE — PROGRESS NOTES
Chief Complaint   Patient presents with    Follow-up     T2DM       HPI   Bobby Medina is a 39 y.o. male presents to the clinic today for follow-up of type 2 diabetes.  Diabetes was diagnosed around age 13-14.  He was tested for type I, he is type II.  A1c today 7.8. He checks his blood glucose with candido 3 CGM. He is currently taking Toujeo 70 units daily, metformin 1,000 mg extended release daily and NovoLog 8-12 units 3 times daily with meals. He stopped Mounjaro due to cost and started taking Novolog instead until he meets his deductible and can afford Mounjaro again. His blood sugars have been much higher since he stopped Mounjaro. He has adjusted his basal insulin and mealtime insulin to help with this.  He reports some hypoglycemia that he is able to manage himself. 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     Diabetic complications: none  Last optho exam: due and advised  Last microalbumin: today  Last foot exam: UTD  Statin: no   Lipid panel: today  ACE/ARB: yes     The following portions of the patient's history were reviewed and updated as appropriate: allergies, current medications, past family history, past medical history, past social history, past surgical history, and problem list.    /90 (BP Location: Right arm, Patient Position: Sitting, Cuff Size: Large Adult)   Pulse 76   Wt 115 kg (254 lb 3.2 oz)   SpO2 98%   BMI 31.77 kg/m²    Past Medical History:   Diagnosis Date    Hypercholesterolemia     Hypoglycemia     Well controlled type 2 diabetes mellitus      Past Surgical History:   Procedure Laterality Date    FIBULA FRACTURE SURGERY Right 02/27/1998      Family History   Problem Relation Age of Onset    Leukemia Mother         and NHL    Diabetes Father     Hypertension Father     Obesity Father     Diabetes Sister     Obesity Sister     Diabetes Brother     Obesity Brother       Social History     Socioeconomic History    Marital status:    Tobacco  "Use    Smoking status: Never    Smokeless tobacco: Never   Vaping Use    Vaping status: Never Used   Substance and Sexual Activity    Alcohol use: Not Currently    Drug use: Never    Sexual activity: Defer      No Known Allergies   Current Outpatient Medications on File Prior to Visit   Medication Sig Dispense Refill    Blood Glucose Monitoring Suppl (ONE TOUCH ULTRA 2) w/Device kit Use to check blood sugar 3 times daily 1 each 0    Insulin Pen Needle (Novofine Pen Needle) 32G X 6 MM misc Use to inject insulin once daily as directed 100 each 5    Insulin Syringe-Needle U-100 32G X 5/16\" 1 ML misc Use 1 each 3 times a day. (Patient not taking: Reported on 2/26/2025) 100 each 2    Lancets (OneTouch Delica Plus [id number]) misc Use to check blood sugar 3 times daily 100 each 5    lisinopril (PRINIVIL,ZESTRIL) 10 MG tablet Take 1 tablet by mouth Daily. 90 tablet 2    montelukast (SINGULAIR) 10 MG tablet Take 1 tablet by mouth Every Night. 90 tablet 0     No current facility-administered medications on file prior to visit.      Review of Systems   Constitutional:  Negative for fatigue.   Endocrine: Negative for polydipsia, polyphagia and polyuria.   Psychiatric/Behavioral:  Negative for sleep disturbance.       Physical Exam  Vitals reviewed.   Constitutional:       Appearance: Normal appearance.   HENT:      Head: Normocephalic.   Eyes:      Pupils: Pupils are equal, round, and reactive to light.   Cardiovascular:      Pulses: Normal pulses.   Pulmonary:      Effort: Pulmonary effort is normal.   Abdominal:      Palpations: Abdomen is soft.   Musculoskeletal:      Cervical back: Normal range of motion.   Skin:     General: Skin is warm and dry.   Neurological:      Mental Status: He is alert and oriented to person, place, and time.   Psychiatric:         Mood and Affect: Mood normal.         Behavior: Behavior normal.       Labs/Imaging  CMP:  Lab Results   Component Value Date    BUN 13 02/26/2025    CREATININE 0.91 " 02/26/2025    EGFR 109.9 02/26/2025    BCR 14.3 02/26/2025     02/26/2025    K 4.1 02/26/2025    CO2 23.0 02/26/2025    CALCIUM 9.3 02/26/2025    ALBUMIN 4.1 02/26/2025    AGRATIO 1.6 02/26/2025    BILITOT 0.4 02/26/2025    ALKPHOS 54 02/26/2025    AST 25 02/26/2025    ALT 62 (H) 02/26/2025     Lipid Panel:  Lab Results   Component Value Date    CHOL 227 (H) 02/26/2025    TRIG 104 02/26/2025    HDL 40 02/26/2025    VLDL 19 02/26/2025     (H) 02/26/2025     HbA1c:  Lab Results   Component Value Date    HGBA1C 7.8 (A) 02/26/2025    HGBA1C 8.7 (A) 10/02/2024    HGBA1C 8.7 (A) 03/27/2024     Microalbumin:  Lab Results   Component Value Date    MALBCRERATIO  02/26/2025      Comment:      Unable to calculate     TSH:  Lab Results   Component Value Date    TSH 0.531 02/26/2025       Assessment and Plan  Diagnoses and all orders for this visit:    1. Type 2 diabetes mellitus with hyperglycemia, with long-term current use of insulin (Primary)  Assessment & Plan:  -Diabetes is not controlled, but improving with A1c 7.8.   -CGM data reviewed from the last two weeks shows average glucose 177 with close variability 31.4%. In target range 57%, high 32%, very high 11%, low 0%, very low 0%.   -Pattern of some fasting and post prandial hyperglycemia  -Will increase metformin to 1000 mg extended release twice daily as tolerated  -Continue Toujeo 70 units daily  -Continue NovoLog 8 to 12 units before meals  -Discussed dietary and exercise guidelines with patient. 150 g carbs per day and 150 minutes of exercise per week. Increase protein with complex carbs. Avoid foods high in refined sugars and sugary drinks.   -Discussed the importance of yearly eye exams.  -Discussed Glucagon use and appropriate timing for this.   -S/S hypoglycemia reviewed with Rule of 15's advised.  -Discussed long term risks of untreated/undertreated diabetes including retinopathy, neuropathy, nephropathy, amputations, hospitalizations, MI, CVA.       Orders:  -     POC Glycosylated Hemoglobin (Hb A1C)  -     Comprehensive Metabolic Panel  -     Lipid Panel  -     Microalbumin / Creatinine Urine Ratio - Urine, Clean Catch  -     TSH       Return in about 6 months (around 8/26/2025). The patient was instructed to contact the clinic with any interval questions or concerns.    Please note that portions of this document were completed with a voice recognition program. Efforts were made to edit the dictations, but occasionally words are mis-transcribed.  This document has been electronically signed by PEDRO Adams  February 27, 2025 16:06 EST

## 2025-02-26 NOTE — PROGRESS NOTES
Specialty Pharmacy Patient Management Program  Endocrinology Reassessment     Bobby Medina is a 39 y.o. male with Type 2 Diabetes enrolled in the Endocrinology Patient Management program offered by [facility]. An assessment of continued therapy appropriateness, medication adherence, and side effect incidence and management was conducted for Metformin and insulin therapy.     Pt is currently taking Metformin XR 1000mg po daily, Toujeo 70 units subq nightly, and Novolog 8-12 units bid prior to meals.  Pt was previously on Mounjaro 10mg subq weekly but stopped it around a month ago due to cost.  Pt stated that he started the Novolog back after he stopped the Mounjaro.    The pt checks his BG with Freestyle Lilli 3 sensors with average BG of 167.  The pt does report 3-4 low BG < 70 episodes the last two weeks.  Pt states that they have been happening at noon after he administers his Novolog, eats a meal, and then goes to sleep (pt works 3rd shift).    The pt denies any side effects or adherence issues with his diabetic medications.    Patient denies personal/family history of thyroid cancer, denies history of pancreatitis, and denies issues with recurrent UTI/yeast infections.     In the past, the patient has tried:     Drug Dose Reason for Discontinuation Notes   Soliqua  Medication change                  Changes to Insurance Coverage or Financial Support  None    Relevant Past Medical History and Comorbidities  Relevant medical history and concomitant health conditions were discussed with the patient. The patient's chart has been reviewed for relevant past medical history and comorbid health conditions and updated as necessary.   Past Medical History:   Diagnosis Date    Hypercholesterolemia     Hypoglycemia     Well controlled type 2 diabetes mellitus      Social History     Socioeconomic History    Marital status:    Tobacco Use    Smoking status: Never    Smokeless tobacco: Never    Vaping Use    Vaping status: Never Used   Substance and Sexual Activity    Alcohol use: Not Currently    Drug use: Never    Sexual activity: Defer       Problem list reviewed by Aneesh Peter RPH on 2/26/2025 at  1:27 PM    Allergies  Known allergies and reactions were discussed with the patient. The patient's chart has been reviewed for allergy information and updated as necessary.   Patient has no known allergies.    Allergies reviewed by Aneesh Peter RPH on 2/26/2025 at 11:22 AM  Allergies reviewed by Aneesh Peter RPH on 2/26/2025 at  1:27 PM    Relevant Laboratory Values  A1C Last 3 Results          3/27/2024    11:08 10/2/2024    08:56 2/26/2025    11:08   HGBA1C Last 3 Results   Hemoglobin A1C 8.7  8.7  7.8      Lab Results   Component Value Date    HGBA1C 7.8 (A) 02/26/2025     Lab Results   Component Value Date    GLUCOSE 139 (H) 11/02/2021    CALCIUM 9.6 11/02/2021     11/02/2021    K 4.2 11/02/2021    CO2 26.4 11/02/2021     11/02/2021    BUN 11 11/02/2021    CREATININE 0.65 (L) 11/02/2021    EGFRIFNONA 139 11/02/2021    BCR 16.9 11/02/2021    ANIONGAP 9.6 11/02/2021     Lab Results   Component Value Date    CHOL 205 (H) 11/02/2021    TRIG 148 11/02/2021    HDL 39 (L) 11/02/2021     (H) 11/02/2021     Microalbumin          10/2/2024    09:37   Microalbumin   Microalbumin, Urine 1.3    Current Medication List  This medication list has been reviewed with the patient and evaluated for any interactions or necessary modifications/recommendations, and updated to include all prescription medications, OTC medications, and supplements the patient is currently taking.  This list reflects what is contained in the patient's profile, which has also been marked as reviewed to communicate to other providers it is the most up to date version of the patient's current medication therapy.     Current Outpatient Medications:     Blood Glucose Monitoring Suppl (ONE TOUCH ULTRA 2) w/Device kit, Use  "to check blood sugar 3 times daily, Disp: 1 each, Rfl: 0    Continuous Glucose Sensor (FreeStyle Lilli 3 Sensor) misc, Change sensor every 14 days, Disp: 2 each, Rfl: 5    insulin aspart (novoLOG FLEXPEN) 100 UNIT/ML solution pen-injector sc pen, Inject 8-12 Units under the skin into the appropriate area as directed 2 (Two) Times a Day Before Meals., Disp: , Rfl:     Insulin Glargine, 1 Unit Dial, (Toujeo SoloStar) 300 UNIT/ML solution pen-injector injection, Inject up to 70 units nightly as directed, Disp: 9 mL, Rfl: 5    Insulin Pen Needle (Novofine Pen Needle) 32G X 6 MM misc, Use to inject insulin once daily as directed, Disp: 100 each, Rfl: 5    Lancets (OneTouch Delica Plus [id number]) misc, Use to check blood sugar 3 times daily, Disp: 100 each, Rfl: 5    lisinopril (PRINIVIL,ZESTRIL) 10 MG tablet, Take 1 tablet by mouth Daily., Disp: 90 tablet, Rfl: 2    metFORMIN ER (GLUCOPHAGE-XR) 500 MG 24 hr tablet, Take 2 tablets by mouth Daily With Breakfast., Disp: 60 tablet, Rfl: 5    montelukast (SINGULAIR) 10 MG tablet, Take 1 tablet by mouth Every Night., Disp: 90 tablet, Rfl: 0    Insulin Syringe-Needle U-100 32G X 5/16\" 1 ML misc, Use 1 each 3 times a day. (Patient not taking: Reported on 2/26/2025), Disp: 100 each, Rfl: 2    Medicines reviewed by Aneesh Peter [facility] on 2/26/2025 at 11:28 AM    Drug Interactions  No significant drug-drug interactions with diabetes medications expected according to literature.     Adverse Drug Reactions  Adverse Reactions Experienced: None  Plan for ADR Management: None    Hospitalizations and Urgent Care Since Last Assessment  Hospitalizations or Admissions: None  ED Visits: None   Urgent Office Visits: None    Adherence and Self-Administration  Adherence related patient's specialty therapy was discussed with the patient. The Adherence segment of this outreach has been reviewed and updated.      Ongoing or New Barriers to Patient Adherence and/or Self-Administration: " "None  Methods for Supporting Patient Adherence and/or Self-Administration: None required    Goals of Therapy  Goals related to the patient's specialty therapy was discussed with the patient. The Patient Goals segment of this outreach has been reviewed and updated.    Goals Addressed Today        HEMOGLOBIN A1C < 7        Pt not at goal as A1c = 7.8% today which is an improvement since last visit.  Pt is currently not taking mounjaro due to cost but will restart once he meets his deductible.  In the meantime pt will continue novolog with meals to help improve A1c further.            Reassessment Plan & Follow-Up   Medication Therapy Changes: Per Bridgett Manning, APRN:  DC Mounjaro at this time due to medication cost.  Per provider pt may be started back on Mounjaro once he meets his deductible and cost has improved for pt. Pt will likely need to restart mounjaro at lower dose once he restarts.  Continue Toujeo 70 units subq nightly  Continue Novolog 8-12 units subq twice daily prior to meals  Continue Metformin XR 1000mg po daily.  Per provider pt will have trial with adding a second Metformin 1000mg dose per day in the afternoon. New rx to be written with \"Take up to 1000mg po BID\" per provider.  Continue Freestyle Lilli 3 sensors  Additional Plans, Therapy Recommendations, or Therapy Problems to Be Addressed:   Will send refills for Toujeo, Novolog, Metformin, and Freestyle sensors to [facility] for shipping.  Recommended lipid panel and CMP be drawn with today's visit.  Recommended Prevnar-20 and yearly flu vaccine.  Pharmacist to perform regular reassessments no more than (6) months from the previous assessment.  Care Coordinator to set up future refill outreaches, coordinate prescription delivery, and escalate clinical questions to pharmacist.     Attestation  I attest the patient was actively involved in and has agreed to the above plan of care. If the prescribed therapy is at any point deemed not " appropriate based on the current or future assessments, a consultation will be initiated with the patient's specialty care provider to determine the best course of action. The revised plan of therapy will be documented along with any required assessments and/or additional patient education provided.     Aneesh Peter RPH  02/26/25  13:33 EST

## 2025-02-27 PROBLEM — Z79.4 TYPE 2 DIABETES MELLITUS WITH HYPERGLYCEMIA, WITH LONG-TERM CURRENT USE OF INSULIN: Chronic | Status: ACTIVE | Noted: 2021-11-02

## 2025-02-27 PROBLEM — E11.65 TYPE 2 DIABETES MELLITUS WITH HYPERGLYCEMIA, WITH LONG-TERM CURRENT USE OF INSULIN: Chronic | Status: ACTIVE | Noted: 2021-11-02

## 2025-02-27 LAB
ALBUMIN SERPL-MCNC: 4.1 G/DL (ref 3.5–5.2)
ALBUMIN UR-MCNC: <1.2 MG/DL
ALBUMIN/GLOB SERPL: 1.6 G/DL
ALP SERPL-CCNC: 54 U/L (ref 39–117)
ALT SERPL W P-5'-P-CCNC: 62 U/L (ref 1–41)
ANION GAP SERPL CALCULATED.3IONS-SCNC: 12 MMOL/L (ref 5–15)
AST SERPL-CCNC: 25 U/L (ref 1–40)
BILIRUB SERPL-MCNC: 0.4 MG/DL (ref 0–1.2)
BUN SERPL-MCNC: 13 MG/DL (ref 6–20)
BUN/CREAT SERPL: 14.3 (ref 7–25)
CALCIUM SPEC-SCNC: 9.3 MG/DL (ref 8.6–10.5)
CHLORIDE SERPL-SCNC: 103 MMOL/L (ref 98–107)
CHOLEST SERPL-MCNC: 227 MG/DL (ref 0–200)
CO2 SERPL-SCNC: 23 MMOL/L (ref 22–29)
CREAT SERPL-MCNC: 0.91 MG/DL (ref 0.76–1.27)
CREAT UR-MCNC: 110.8 MG/DL
EGFRCR SERPLBLD CKD-EPI 2021: 109.9 ML/MIN/1.73
GLOBULIN UR ELPH-MCNC: 2.5 GM/DL
GLUCOSE SERPL-MCNC: 222 MG/DL (ref 65–99)
HDLC SERPL-MCNC: 40 MG/DL (ref 40–60)
LDLC SERPL CALC-MCNC: 168 MG/DL (ref 0–100)
LDLC/HDLC SERPL: 4.16 {RATIO}
MICROALBUMIN/CREAT UR: NORMAL MG/G{CREAT}
POTASSIUM SERPL-SCNC: 4.1 MMOL/L (ref 3.5–5.2)
PROT SERPL-MCNC: 6.6 G/DL (ref 6–8.5)
SODIUM SERPL-SCNC: 138 MMOL/L (ref 136–145)
TRIGL SERPL-MCNC: 104 MG/DL (ref 0–150)
TSH SERPL DL<=0.05 MIU/L-ACNC: 0.53 UIU/ML (ref 0.27–4.2)
VLDLC SERPL-MCNC: 19 MG/DL (ref 5–40)

## 2025-02-27 RX ORDER — ROSUVASTATIN CALCIUM 5 MG/1
5 TABLET, COATED ORAL DAILY
Qty: 90 TABLET | Refills: 1 | Status: SHIPPED | OUTPATIENT
Start: 2025-02-27

## 2025-02-27 RX ORDER — METFORMIN HYDROCHLORIDE 500 MG/1
TABLET, EXTENDED RELEASE ORAL
Qty: 120 TABLET | Refills: 5 | Status: SHIPPED | OUTPATIENT
Start: 2025-02-27

## 2025-02-27 NOTE — ASSESSMENT & PLAN NOTE
-Diabetes is not controlled, but improving with A1c 7.8.   -CGM data reviewed from the last two weeks shows average glucose 177 with close variability 31.4%. In target range 57%, high 32%, very high 11%, low 0%, very low 0%.   -Pattern of some fasting and post prandial hyperglycemia  -Will increase metformin to 1000 mg extended release twice daily as tolerated  -Continue Toujeo 70 units daily  -Continue NovoLog 8 to 12 units before meals  -Discussed dietary and exercise guidelines with patient. 150 g carbs per day and 150 minutes of exercise per week. Increase protein with complex carbs. Avoid foods high in refined sugars and sugary drinks.   -Discussed the importance of yearly eye exams.  -Discussed Glucagon use and appropriate timing for this.   -S/S hypoglycemia reviewed with Rule of 15's advised.  -Discussed long term risks of untreated/undertreated diabetes including retinopathy, neuropathy, nephropathy, amputations, hospitalizations, MI, CVA.

## 2025-03-10 ENCOUNTER — SPECIALTY PHARMACY (OUTPATIENT)
Dept: PHARMACY | Facility: HOSPITAL | Age: 40
End: 2025-03-10
Payer: COMMERCIAL

## 2025-03-10 DIAGNOSIS — E11.65 TYPE 2 DIABETES MELLITUS WITH HYPERGLYCEMIA, WITH LONG-TERM CURRENT USE OF INSULIN: Primary | Chronic | ICD-10-CM

## 2025-03-10 DIAGNOSIS — Z79.4 TYPE 2 DIABETES MELLITUS WITH HYPERGLYCEMIA, WITH LONG-TERM CURRENT USE OF INSULIN: Primary | Chronic | ICD-10-CM

## 2025-03-10 RX ORDER — INSULIN LISPRO 100 [IU]/ML
8-12 INJECTION, SOLUTION INTRAVENOUS; SUBCUTANEOUS
Qty: 15 ML | Refills: 2 | Status: SHIPPED | OUTPATIENT
Start: 2025-03-10

## 2025-03-10 NOTE — PROGRESS NOTES
Specialty Pharmacy Patient Management Program  Per Protocol Prescription Order or Refill     Patient will be filling or currently fills medications at [facility] and is enrolled in the Patient Management Program.    Requested Prescriptions     Pending Prescriptions Disp Refills    Insulin Lispro, 1 Unit Dial, (HumaLOG KwikPen) 100 UNIT/ML solution pen-injector 15 mL 2     Sig: Inject 8-12 Units under the skin into the appropriate area as directed 2 (Two) Times a Day Before Meals.     Prescription orders above were sent to the pharmacy per Collaborative Care Agreement Protocol.  Pt is changing from Novolog to Humalog at this time.    Last Office Visit: 2/26/2025  Next Office Visit: 8/27/2025    Aneesh Peter RPH  3/10/2025  11:56 EDT

## 2025-03-13 ENCOUNTER — SPECIALTY PHARMACY (OUTPATIENT)
Dept: PHARMACY | Facility: HOSPITAL | Age: 40
End: 2025-03-13
Payer: COMMERCIAL

## 2025-03-13 NOTE — PROGRESS NOTES
Specialty Pharmacy Patient Management Program  Refill Outreach     Bobby was contacted today regarding refills of their medication(s).    Refill Questions      Flowsheet Row Most Recent Value   Changes to allergies? No   Changes to medications? No   New conditions or infections since last clinic visit No   Unplanned office visit, urgent care, ED, or hospital admission in the last 4 weeks  No   How does patient/caregiver feel medication is working? Very good   Financial problems or insurance changes  No   If yes, describe changes in insurance or financial issues. na   Since the previous refill, were any specialty medication doses or scheduled injections missed or delayed?  No   If yes, please provide the amount na   Why were doses missed? na   Does this patient require a clinical escalation to a pharmacist? No            Delivery Questions      Flowsheet Row Most Recent Value   Delivery method UPS   Delivery address verified with patient/caregiver? Yes   Delivery address Home   Number of medications in delivery 2   Medication(s) being filled and delivered Continuous Glucose Sensor (FreeStyle Lilli 3 Sensor), metFORMIN HCl (GLUCOPHAGE-XR)   Doses left of specialty medications na   Copay verified? Yes   Copay amount 75.78$   Copay form of payment Credit/debit on file   Delivery Date Selection 03/14/25   Signature Required No                 Follow-up: 30 day(s)     Verona Reed, Pharmacy Technician  3/13/2025  11:32 EDT

## 2025-03-19 ENCOUNTER — SPECIALTY PHARMACY (OUTPATIENT)
Dept: PHARMACY | Facility: HOSPITAL | Age: 40
End: 2025-03-19
Payer: COMMERCIAL

## 2025-03-19 DIAGNOSIS — E11.65 TYPE 2 DIABETES MELLITUS WITH HYPERGLYCEMIA, WITH LONG-TERM CURRENT USE OF INSULIN: Primary | Chronic | ICD-10-CM

## 2025-03-19 DIAGNOSIS — Z79.4 TYPE 2 DIABETES MELLITUS WITH HYPERGLYCEMIA, WITH LONG-TERM CURRENT USE OF INSULIN: Primary | Chronic | ICD-10-CM

## 2025-03-19 NOTE — PROGRESS NOTES
Specialty Pharmacy Patient Management Program  Prescription Order or Refill Request     Patient will be filling or currently fills medications at [facility], and requires a prescription order/refill on the following:      Requested Prescriptions     Pending Prescriptions Disp Refills    Tirzepatide 2.5 MG/0.5ML solution auto-injector 2 mL 0     Sig: Inject 2 mL under the skin into the appropriate area as directed 1 (One) Time Per Week.     Sending in Mounjaro 2.5mg subq weekly per verbal order from Bridgett VASQUEZ.  Pt was off of this medication for around two months due to cost but it is now affordable again for him.  Pt starting at 2.5mg dose again at this time per provider.    Last Office Visit: 2/26/2025  Next Office Visit: 8/27/2025    Aneesh Peter RPH  3/19/2025  13:44 EDT

## 2025-04-07 ENCOUNTER — SPECIALTY PHARMACY (OUTPATIENT)
Dept: PHARMACY | Facility: HOSPITAL | Age: 40
End: 2025-04-07
Payer: COMMERCIAL

## 2025-04-07 NOTE — PROGRESS NOTES
Specialty Pharmacy Patient Management Program  Refill Outreach     Bobby was contacted today regarding refills of their medication(s).    Refill Questions      Flowsheet Row Most Recent Value   Changes to allergies? No   Changes to medications? No   New conditions or infections since last clinic visit No   Unplanned office visit, urgent care, ED, or hospital admission in the last 4 weeks  No   How does patient/caregiver feel medication is working? Very good   Financial problems or insurance changes  No   If yes, describe changes in insurance or financial issues. na   Since the previous refill, were any specialty medication doses or scheduled injections missed or delayed?  No   If yes, please provide the amount na   Why were doses missed? na   Does this patient require a clinical escalation to a pharmacist? No            Delivery Questions      Flowsheet Row Most Recent Value   Delivery method UPS   Delivery address verified with patient/caregiver? Yes   Delivery address Home   Number of medications in delivery 4   Medication(s) being filled and delivered Lisinopril (PRINIVIL,ZESTRIL), Tirzepatide (Mounjaro), Insulin Lispro (HUMALOG), Insulin Glargine (Toujeo SoloStar)   Doses left of specialty medications na   Copay verified? Yes   Copay amount 185.80$   Copay form of payment HSA/FSA on file   Delivery Date Selection 04/08/25   Signature Required No   Do you consent to receive electronic handouts?  Yes                 Follow-up: 30 day(s)     Verona Reed, Pharmacy Technician  4/7/2025  10:47 EDT

## 2025-04-09 ENCOUNTER — SPECIALTY PHARMACY (OUTPATIENT)
Dept: PHARMACY | Facility: HOSPITAL | Age: 40
End: 2025-04-09
Payer: COMMERCIAL

## 2025-04-09 NOTE — PROGRESS NOTES
Specialty Pharmacy Patient Management Program  Refill Outreach     Bobby was contacted today regarding refills of their medication(s).    Refill Questions      Flowsheet Row Most Recent Value   Changes to allergies? No   Changes to medications? No   New conditions or infections since last clinic visit No   Unplanned office visit, urgent care, ED, or hospital admission in the last 4 weeks  No   How does patient/caregiver feel medication is working? Very good   Financial problems or insurance changes  No   Since the previous refill, were any specialty medication doses or scheduled injections missed or delayed?  No   Why were doses missed? na   Does this patient require a clinical escalation to a pharmacist? No            Delivery Questions      Flowsheet Row Most Recent Value   Delivery method UPS   Delivery address verified with patient/caregiver? Yes   Delivery address Home   Number of medications in delivery 2   Medication(s) being filled and delivered Continuous Glucose Sensor (FreeStyle Lilli 3 Sensor), metFORMIN HCl (GLUCOPHAGE-XR)   Doses left of specialty medications na   Copay verified? Yes   Copay amount 34.07$   Copay form of payment HSA/FSA on file   Delivery Date Selection 04/10/25   Signature Required No   Do you consent to receive electronic handouts?  Yes                 Follow-up: 30 day(s)     Verona Reed, Pharmacy Technician  4/9/2025  09:12 EDT

## 2025-05-05 ENCOUNTER — SPECIALTY PHARMACY (OUTPATIENT)
Dept: PHARMACY | Facility: HOSPITAL | Age: 40
End: 2025-05-05
Payer: COMMERCIAL

## 2025-05-05 NOTE — PROGRESS NOTES
Specialty Pharmacy Patient Management Program  Refill Outreach     Bobby was contacted today regarding refills of their medication(s).    Refill Questions      Flowsheet Row Most Recent Value   Changes to allergies? No   Changes to medications? No   New conditions or infections since last clinic visit No   Unplanned office visit, urgent care, ED, or hospital admission in the last 4 weeks  No   How does patient/caregiver feel medication is working? Very good   Financial problems or insurance changes  No   Since the previous refill, were any specialty medication doses or scheduled injections missed or delayed?  No   Why were doses missed? na   Does this patient require a clinical escalation to a pharmacist? No            Delivery Questions      Flowsheet Row Most Recent Value   Delivery method UPS   Delivery address verified with patient/caregiver? Yes   Delivery address Home   Number of medications in delivery 5   Medication(s) being filled and delivered Lisinopril (PRINIVIL,ZESTRIL), Insulin Glargine (Toujeo SoloStar), Continuous Glucose Sensor (FreeStyle Lilli 3 Sensor), Rosuvastatin Calcium (CRESTOR), metFORMIN HCl (GLUCOPHAGE-XR), Insulin Lispro (HUMALOG)   Doses left of specialty medications na   Copay verified? Yes   Copay amount 112.68$   Copay form of payment Credit/debit on file   Delivery Date Selection 05/07/25   Signature Required No   Do you consent to receive electronic handouts?  Yes                 Follow-up: 30 day(s)     Brigid Barrera, Pharmacy Technician  5/5/2025  15:49 EDT

## 2025-05-30 ENCOUNTER — SPECIALTY PHARMACY (OUTPATIENT)
Dept: PHARMACY | Facility: HOSPITAL | Age: 40
End: 2025-05-30
Payer: COMMERCIAL

## 2025-05-30 NOTE — PROGRESS NOTES
Specialty Pharmacy Patient Management Program  Refill Outreach     Bobby was contacted today regarding refills of their medication(s).    Refill Questions      Flowsheet Row Most Recent Value   Changes to allergies? No   Changes to medications? No   New conditions or infections since last clinic visit No   Unplanned office visit, urgent care, ED, or hospital admission in the last 4 weeks  No   How does patient/caregiver feel medication is working? Very good   Financial problems or insurance changes  No   If yes, describe changes in insurance or financial issues. na   Since the previous refill, were any specialty medication doses or scheduled injections missed or delayed?  No   If yes, please provide the amount na   Why were doses missed? na   Does this patient require a clinical escalation to a pharmacist? No            Delivery Questions      Flowsheet Row Most Recent Value   Delivery method UPS   Delivery address verified with patient/caregiver? Yes   Delivery address Home   Number of medications in delivery 4   Medication(s) being filled and delivered Lisinopril (PRINIVIL,ZESTRIL), Continuous Glucose Sensor (FreeStyle Lilli 3 Sensor), Rosuvastatin Calcium (CRESTOR), metFORMIN HCl (GLUCOPHAGE-XR)   Doses left of specialty medications na   Copay verified? Yes   Copay amount 37.07$   Copay form of payment HSA/FSA on file   Delivery Date Selection 06/02/25   Signature Required No   Do you consent to receive electronic handouts?  Yes                 Follow-up: 30 day(s)     Verona Reed, Pharmacy Technician  5/30/2025  14:18 EDT

## 2025-06-11 ENCOUNTER — SPECIALTY PHARMACY (OUTPATIENT)
Dept: PHARMACY | Facility: HOSPITAL | Age: 40
End: 2025-06-11
Payer: COMMERCIAL

## 2025-06-11 DIAGNOSIS — E11.65 TYPE 2 DIABETES MELLITUS WITH HYPERGLYCEMIA, WITH LONG-TERM CURRENT USE OF INSULIN: Primary | Chronic | ICD-10-CM

## 2025-06-11 DIAGNOSIS — Z79.4 TYPE 2 DIABETES MELLITUS WITH HYPERGLYCEMIA, WITH LONG-TERM CURRENT USE OF INSULIN: Primary | Chronic | ICD-10-CM

## 2025-06-11 RX ORDER — MECLIZINE HCL 12.5 MG 12.5 MG/1
12.5 TABLET ORAL 3 TIMES DAILY PRN
Qty: 90 TABLET | Refills: 0 | Status: SHIPPED | OUTPATIENT
Start: 2025-06-11

## 2025-06-11 RX ORDER — TIRZEPATIDE 5 MG/.5ML
5 INJECTION, SOLUTION SUBCUTANEOUS WEEKLY
Qty: 2 ML | Refills: 5 | Status: SHIPPED | OUTPATIENT
Start: 2025-06-11

## 2025-06-11 NOTE — PROGRESS NOTES
Specialty Pharmacy Patient Management Program  Refill Outreach     Bobby was contacted today regarding refills of their medication(s).    Refill Questions      Flowsheet Row Most Recent Value   Changes to allergies? No   Changes to medications? No   New conditions or infections since last clinic visit No   Unplanned office visit, urgent care, ED, or hospital admission in the last 4 weeks  No   How does patient/caregiver feel medication is working? Very good   Financial problems or insurance changes  No   If yes, describe changes in insurance or financial issues. NA   Since the previous refill, were any specialty medication doses or scheduled injections missed or delayed?  No   If yes, please provide the amount NA   Why were doses missed? NA   Does this patient require a clinical escalation to a pharmacist? No            Delivery Questions      Flowsheet Row Most Recent Value   Delivery method UPS   Delivery address verified with patient/caregiver? Yes   Delivery address Home   Number of medications in delivery 1   Medication(s) being filled and delivered --  [MOUNJARO 5MG]   Doses left of specialty medications NA   Copay verified? Yes   Copay amount 110.19$   Copay form of payment Credit/debit on file   Delivery Date Selection 06/12/25   Signature Required No   Do you consent to receive electronic handouts?  Yes                 Follow-up: 30 day(s)     Verona Reed, Pharmacy Technician  6/11/2025  10:42 EDT

## 2025-06-11 NOTE — PROGRESS NOTES
Specialty Pharmacy Patient Management Program  Prescription Order or Refill Request     Patient will be filling or currently fills medications at [facility], and requires a prescription order/refill on the following:      Requested Prescriptions     Signed Prescriptions Disp Refills    Tirzepatide (Mounjaro) 5 MG/0.5ML solution auto-injector 2 mL 5     Sig: Inject 5 mg under the skin into the appropriate area as directed 1 (One) Time Per Week.     Patient had stopped taking Mounjaro due to cost issues. This has since resolved and provider wanted to increase back up monthly based on tolerability. Patient is taking Mounjaro 2.5 mg weekly now and reports tolerating well, denying any side effects. He would like to increase dose today. Prescription orders above were sent to the pharmacy per verbal order from PEDRO Hinkle.     Last Office Visit: 2/26/25  Next Office Visit: 8/27/25    Kerri Bolaños, PharmD, [facility]  Clinical Specialty Pharmacist, Endocrinology   6/11/2025  14:47 EDT

## 2025-07-09 ENCOUNTER — SPECIALTY PHARMACY (OUTPATIENT)
Dept: PHARMACY | Facility: HOSPITAL | Age: 40
End: 2025-07-09
Payer: COMMERCIAL

## 2025-07-09 RX ORDER — HYDROCHLOROTHIAZIDE 12.5 MG/1
CAPSULE ORAL
Qty: 2 EACH | Refills: 2 | Status: SHIPPED | OUTPATIENT
Start: 2025-07-09

## 2025-07-09 NOTE — PROGRESS NOTES
Specialty Pharmacy Patient Management Program  Refill Outreach     Bobby was contacted today regarding refills of their medication(s).    Refill Questions      Flowsheet Row Most Recent Value   Changes to allergies? No   Changes to medications? No   New conditions or infections since last clinic visit No   Unplanned office visit, urgent care, ED, or hospital admission in the last 4 weeks  No   How does patient/caregiver feel medication is working? Very good   Financial problems or insurance changes  No   Since the previous refill, were any specialty medication doses or scheduled injections missed or delayed?  No   Does this patient require a clinical escalation to a pharmacist? No            Delivery Questions      Flowsheet Row Most Recent Value   Delivery method UPS   Delivery address verified with patient/caregiver? Yes   Delivery address Home   Number of medications in delivery 4   Medication(s) being filled and delivered Insulin Glargine (Toujeo SoloStar), Continuous Glucose Sensor (FreeStyle Lilli 3 Sensor), metFORMIN HCl (GLUCOPHAGE-XR), Tirzepatide (Mounjaro), Insulin Lispro (HUMALOG)   Doses left of specialty medications NA   Copay verified? Yes   Copay amount 0.00$   Copay form of payment No copayment ($0)   Delivery Date Selection 07/10/25   Signature Required No   Do you consent to receive electronic handouts?  Yes                 Follow-up: 30 day(s)     Verona Reed, Pharmacy Technician  7/9/2025  11:10 EDT

## 2025-08-11 ENCOUNTER — SPECIALTY PHARMACY (OUTPATIENT)
Dept: PHARMACY | Facility: HOSPITAL | Age: 40
End: 2025-08-11
Payer: COMMERCIAL

## 2025-08-11 DIAGNOSIS — E11.65 TYPE 2 DIABETES MELLITUS WITH HYPERGLYCEMIA, WITH LONG-TERM CURRENT USE OF INSULIN: Chronic | ICD-10-CM

## 2025-08-11 DIAGNOSIS — Z79.4 TYPE 2 DIABETES MELLITUS WITH HYPERGLYCEMIA, WITH LONG-TERM CURRENT USE OF INSULIN: Chronic | ICD-10-CM

## 2025-08-11 RX ORDER — INSULIN LISPRO 100 [IU]/ML
8-12 INJECTION, SOLUTION INTRAVENOUS; SUBCUTANEOUS
Qty: 15 ML | Refills: 2 | Status: SHIPPED | OUTPATIENT
Start: 2025-08-11